# Patient Record
Sex: FEMALE | Race: WHITE | Employment: FULL TIME | URBAN - NONMETROPOLITAN AREA
[De-identification: names, ages, dates, MRNs, and addresses within clinical notes are randomized per-mention and may not be internally consistent; named-entity substitution may affect disease eponyms.]

---

## 2019-02-22 ENCOUNTER — HOSPITAL ENCOUNTER (OUTPATIENT)
Dept: CT IMAGING | Age: 49
Discharge: HOME | End: 2019-02-22
Payer: COMMERCIAL

## 2019-02-22 VITALS
HEIGHT: 69 IN | BODY MASS INDEX: 40.43 KG/M2 | TEMPERATURE: 97.9 F | OXYGEN SATURATION: 97 % | RESPIRATION RATE: 17 BRPM | WEIGHT: 273 LBS | HEART RATE: 74 BPM | SYSTOLIC BLOOD PRESSURE: 128 MMHG | DIASTOLIC BLOOD PRESSURE: 69 MMHG

## 2019-02-22 DIAGNOSIS — N18.1 CHRONIC KIDNEY DISEASE, STAGE I: ICD-10-CM

## 2019-02-22 LAB
INR BLD: 1.04 (ref 0.88–1.18)
PLATELET # BLD: 294 K/UL (ref 130–400)
PROTHROMBIN TIME: 13 SEC (ref 12–14.6)

## 2019-02-22 PROCEDURE — 85049 AUTOMATED PLATELET COUNT: CPT

## 2019-02-22 PROCEDURE — 2709999900 CT GUIDED NEEDLE PLACEMENT

## 2019-02-22 PROCEDURE — 85610 PROTHROMBIN TIME: CPT

## 2019-02-22 PROCEDURE — 2580000003 HC RX 258: Performed by: RADIOLOGY

## 2019-02-22 RX ORDER — MIDAZOLAM HYDROCHLORIDE 1 MG/ML
INJECTION INTRAMUSCULAR; INTRAVENOUS
Status: DISCONTINUED
Start: 2019-02-22 | End: 2019-02-22 | Stop reason: WASHOUT

## 2019-02-22 RX ORDER — SOLIFENACIN SUCCINATE 5 MG/1
10 TABLET, FILM COATED ORAL DAILY
COMMUNITY

## 2019-02-22 RX ORDER — LOSARTAN POTASSIUM AND HYDROCHLOROTHIAZIDE 25; 100 MG/1; MG/1
1 TABLET ORAL DAILY
COMMUNITY
End: 2021-08-24

## 2019-02-22 RX ORDER — CARVEDILOL 3.12 MG/1
3.12 TABLET ORAL 2 TIMES DAILY WITH MEALS
COMMUNITY

## 2019-02-22 RX ORDER — SODIUM CHLORIDE 0.9 % (FLUSH) 0.9 %
10 SYRINGE (ML) INJECTION PRN
Status: DISCONTINUED | OUTPATIENT
Start: 2019-02-22 | End: 2019-02-24 | Stop reason: HOSPADM

## 2019-02-22 RX ORDER — CETIRIZINE HYDROCHLORIDE 10 MG/1
10 TABLET ORAL DAILY
COMMUNITY

## 2019-02-22 RX ADMIN — Medication 10 ML: at 07:50

## 2019-04-19 LAB
ANION GAP SERPL CALCULATED.3IONS-SCNC: 11 MMOL/L (ref 7–19)
BUN BLDV-MCNC: 10 MG/DL (ref 6–20)
CALCIUM SERPL-MCNC: 9.4 MG/DL (ref 8.6–10)
CHLORIDE BLD-SCNC: 101 MMOL/L (ref 98–111)
CO2: 27 MMOL/L (ref 22–29)
CREAT SERPL-MCNC: 0.6 MG/DL (ref 0.5–0.9)
GFR NON-AFRICAN AMERICAN: >60
GLUCOSE BLD-MCNC: 130 MG/DL (ref 74–109)
POTASSIUM SERPL-SCNC: 3.4 MMOL/L (ref 3.5–5)
SODIUM BLD-SCNC: 139 MMOL/L (ref 136–145)

## 2020-01-07 LAB
ALBUMIN SERPL-MCNC: 3.9 G/DL (ref 3.5–5.2)
ALP BLD-CCNC: 58 U/L (ref 35–104)
ALT SERPL-CCNC: 20 U/L (ref 5–33)
ANION GAP SERPL CALCULATED.3IONS-SCNC: 15 MMOL/L (ref 7–19)
AST SERPL-CCNC: 17 U/L (ref 5–32)
BACTERIA: ABNORMAL /HPF
BASOPHILS ABSOLUTE: 0.1 K/UL (ref 0–0.2)
BASOPHILS RELATIVE PERCENT: 0.6 % (ref 0–1)
BILIRUB SERPL-MCNC: 0.7 MG/DL (ref 0.2–1.2)
BILIRUBIN URINE: NEGATIVE
BLOOD, URINE: NEGATIVE
BUN BLDV-MCNC: 13 MG/DL (ref 6–20)
CALCIUM SERPL-MCNC: 9.3 MG/DL (ref 8.6–10)
CHLORIDE BLD-SCNC: 101 MMOL/L (ref 98–111)
CLARITY: CLEAR
CO2: 25 MMOL/L (ref 22–29)
COLOR: YELLOW
CREAT SERPL-MCNC: 0.6 MG/DL (ref 0.5–0.9)
CREATININE URINE: 54.1 MG/DL (ref 4.2–622)
EOSINOPHILS ABSOLUTE: 0.2 K/UL (ref 0–0.6)
EOSINOPHILS RELATIVE PERCENT: 2.4 % (ref 0–5)
EPITHELIAL CELLS, UA: 5 /HPF (ref 0–5)
GFR NON-AFRICAN AMERICAN: >60
GLUCOSE BLD-MCNC: 121 MG/DL (ref 74–109)
GLUCOSE URINE: NEGATIVE MG/DL
HCT VFR BLD CALC: 47 % (ref 37–47)
HEMOGLOBIN: 15.8 G/DL (ref 12–16)
HYALINE CASTS: 4 /HPF (ref 0–8)
IMMATURE GRANULOCYTES #: 0 K/UL
KETONES, URINE: NEGATIVE MG/DL
LEUKOCYTE ESTERASE, URINE: ABNORMAL
LYMPHOCYTES ABSOLUTE: 2.2 K/UL (ref 1.1–4.5)
LYMPHOCYTES RELATIVE PERCENT: 24.7 % (ref 20–40)
MAGNESIUM: 2 MG/DL (ref 1.6–2.6)
MCH RBC QN AUTO: 31 PG (ref 27–31)
MCHC RBC AUTO-ENTMCNC: 33.6 G/DL (ref 33–37)
MCV RBC AUTO: 92.2 FL (ref 81–99)
MONOCYTES ABSOLUTE: 0.6 K/UL (ref 0–0.9)
MONOCYTES RELATIVE PERCENT: 7.2 % (ref 0–10)
NEUTROPHILS ABSOLUTE: 5.7 K/UL (ref 1.5–7.5)
NEUTROPHILS RELATIVE PERCENT: 64.8 % (ref 50–65)
NITRITE, URINE: NEGATIVE
PARATHYROID HORMONE INTACT: 56.4 PG/ML (ref 15–65)
PDW BLD-RTO: 12.3 % (ref 11.5–14.5)
PH UA: 7 (ref 5–8)
PHOSPHORUS: 3.2 MG/DL (ref 2.5–4.5)
PLATELET # BLD: 336 K/UL (ref 130–400)
PMV BLD AUTO: 10.1 FL (ref 9.4–12.3)
POTASSIUM SERPL-SCNC: 3.9 MMOL/L (ref 3.5–5)
PROTEIN PROTEIN: 12 MG/DL (ref 15–45)
PROTEIN UA: NEGATIVE MG/DL
RBC # BLD: 5.1 M/UL (ref 4.2–5.4)
RBC UA: 2 /HPF (ref 0–4)
SODIUM BLD-SCNC: 141 MMOL/L (ref 136–145)
SPECIFIC GRAVITY UA: 1.01 (ref 1–1.03)
TOTAL PROTEIN: 7.7 G/DL (ref 6.6–8.7)
URIC ACID, SERUM: 6.5 MG/DL (ref 2.4–5.7)
UROBILINOGEN, URINE: 0.2 E.U./DL
WBC # BLD: 8.7 K/UL (ref 4.8–10.8)
WBC UA: 12 /HPF (ref 0–5)

## 2021-08-02 ENCOUNTER — OFFICE VISIT (OUTPATIENT)
Dept: SURGERY | Age: 51
End: 2021-08-02
Payer: COMMERCIAL

## 2021-08-02 VITALS
TEMPERATURE: 98.5 F | HEIGHT: 69 IN | WEIGHT: 272.8 LBS | SYSTOLIC BLOOD PRESSURE: 148 MMHG | DIASTOLIC BLOOD PRESSURE: 80 MMHG | BODY MASS INDEX: 40.4 KG/M2

## 2021-08-02 DIAGNOSIS — K80.64 CALCULUS OF GALLBLADDER AND BILE DUCT WITH CHRONIC CHOLECYSTITIS WITHOUT OBSTRUCTION: Primary | ICD-10-CM

## 2021-08-02 PROCEDURE — 99203 OFFICE O/P NEW LOW 30 MIN: CPT | Performed by: PHYSICIAN ASSISTANT

## 2021-08-02 RX ORDER — AMLODIPINE BESYLATE 10 MG/1
10 TABLET ORAL DAILY
COMMUNITY

## 2021-08-02 RX ORDER — FAMOTIDINE 40 MG/1
40 TABLET, FILM COATED ORAL DAILY
COMMUNITY

## 2021-08-02 NOTE — LETTER
Preop Orders:     Patient: Chi Medeiros  : 1970    Hospital:  [facility]    Admitting Physician:  Cindi Hernandez or Pravin Duarte    Diagnosis:Cholecystitis with cholelithiasis    Procedure: laparoscopic or robotic cholecystectomy     Anesthesia: General    Admission:Outpatient    Date:next available    Labs:  Per anesthesia    Pre-Op Meds:  Kefzol 2grm IV    Latex Allergy: no    Beta Blocker: yes - patient advised to take    Medication Instructions: No plavix for 2 weeks prior to procedure; no asprin for 3 days prior to procedure    This has been electronically signed by Mason Price M.D.

## 2021-08-02 NOTE — PROGRESS NOTES
HISTORY OF PRESENT ILLNESS:  Baldo Hua comes today to discuss possible gallbladder surgery. There is some reported post prandial abdominal pain that started several weeks ago. The patient denies fevers, chills or jaundice. Recent ultrasound has shown gallstones. The common bile duct was normal in size. We discussed the pathophysiology of gallbladder disease and the risk benefits and alternatives of surgery. Victoria Torre wishes to proceed. Current Outpatient Medications   Medication Sig Dispense Refill    famotidine (PEPCID) 40 MG tablet Take 40 mg by mouth daily      amLODIPine (NORVASC) 10 MG tablet Take 10 mg by mouth daily      Omega-3 Fatty Acids (FISH OIL PO) Take by mouth      VITAMIN D PO Take by mouth      losartan-hydrochlorothiazide (HYZAAR) 100-25 MG per tablet Take 1 tablet by mouth daily      solifenacin (VESICARE) 5 MG tablet Take 10 mg by mouth daily      cetirizine (ZYRTEC) 10 MG tablet Take 10 mg by mouth daily      levonorgestrel (MIRENA, 52 MG,) IUD 52 mg 1 each by Intrauterine route once      carvedilol (COREG) 3.125 MG tablet Take 3.125 mg by mouth 2 times daily (with meals)       No current facility-administered medications for this visit. Allergies: Sulfa antibiotics     Past Medical History:   Diagnosis Date    Allergic rhinitis     CKD (chronic kidney disease), symptom management only, stage 1     Hyperlipidemia     Hypertension     Proteinuria      Past Surgical History:   Procedure Laterality Date    KIDNEY BIOPSY Right 02/22/2019    MOUTH SURGERY      5 teeth removed    VARICOSE VEIN SURGERY       Family History   Problem Relation Age of Onset    Diabetes Mother     Stroke Mother     Coronary Art Dis Father      Social History     Tobacco Use    Smoking status: Never Smoker    Smokeless tobacco: Never Used   Substance Use Topics    Alcohol use: No       Review of Systems  Reviewed and positive for the above. All other systems negative.     Physical Exam  Blood pressure (!) 148/80, temperature 98.5 °F (36.9 °C), temperature source Temporal, height 5' 9\" (1.753 m), weight 272 lb 12.8 oz (123.7 kg). GENERAL:  Reveals a 45 y.o. female that  appears to be in no acute distress. HEENT:  Head is normocephalic and atraumatic. NECK:  Neck is supple without masses. CHEST:  Patient has normal respiratory effort. Chest is clear bilaterally with good thoracic expansion. HEART:  Heart demonstrated a regular rhythm and rate. ABDOMEN:  Inspection of the abdomen demonstrated the patient to have normal bowel sounds present. The abdomen is soft mild right upper quadrant tenderness. EXTREMITIES:  Extremities demonstrated no cyanosis or pitting edema bilaterally. PSYCHIATRIC:  Patient is oriented to time, place and person. The patient's mood and affect are normal.    IMPRESSION:  Chronic Cholecystitis with Cholelithiasis      PLAN:  The risks, benefits, and options laparoscopic or robotic cholecystectomy were discussed with the patient. She  is willing to proceed with surgery.

## 2021-08-24 ENCOUNTER — HOSPITAL ENCOUNTER (OUTPATIENT)
Dept: PREADMISSION TESTING | Age: 51
Discharge: HOME | End: 2021-08-28
Payer: COMMERCIAL

## 2021-08-24 VITALS — BODY MASS INDEX: 40.58 KG/M2 | HEIGHT: 69 IN | WEIGHT: 274 LBS

## 2021-08-24 LAB
ALBUMIN SERPL-MCNC: 3.9 G/DL (ref 3.5–5.2)
ALP BLD-CCNC: 66 U/L (ref 35–104)
ALT SERPL-CCNC: 27 U/L (ref 5–33)
ANION GAP SERPL CALCULATED.3IONS-SCNC: 14 MMOL/L (ref 7–19)
AST SERPL-CCNC: 23 U/L (ref 5–32)
BASOPHILS ABSOLUTE: 0.1 K/UL (ref 0–0.2)
BASOPHILS RELATIVE PERCENT: 0.8 % (ref 0–1)
BILIRUB SERPL-MCNC: 0.7 MG/DL (ref 0.2–1.2)
BUN BLDV-MCNC: 13 MG/DL (ref 6–20)
CALCIUM SERPL-MCNC: 9.4 MG/DL (ref 8.6–10)
CHLORIDE BLD-SCNC: 103 MMOL/L (ref 98–111)
CO2: 25 MMOL/L (ref 22–29)
CREAT SERPL-MCNC: 0.7 MG/DL (ref 0.5–0.9)
EKG P AXIS: 29 DEGREES
EKG P-R INTERVAL: 176 MS
EKG Q-T INTERVAL: 422 MS
EKG QRS DURATION: 100 MS
EKG QTC CALCULATION (BAZETT): 430 MS
EKG T AXIS: 7 DEGREES
EOSINOPHILS ABSOLUTE: 0.3 K/UL (ref 0–0.6)
EOSINOPHILS RELATIVE PERCENT: 3.2 % (ref 0–5)
GFR AFRICAN AMERICAN: >59
GFR NON-AFRICAN AMERICAN: >60
GLUCOSE BLD-MCNC: 130 MG/DL (ref 74–109)
HCT VFR BLD CALC: 45.2 % (ref 37–47)
HEMOGLOBIN: 14.9 G/DL (ref 12–16)
IMMATURE GRANULOCYTES #: 0 K/UL
LYMPHOCYTES ABSOLUTE: 2.2 K/UL (ref 1.1–4.5)
LYMPHOCYTES RELATIVE PERCENT: 25.4 % (ref 20–40)
MCH RBC QN AUTO: 30.9 PG (ref 27–31)
MCHC RBC AUTO-ENTMCNC: 33 G/DL (ref 33–37)
MCV RBC AUTO: 93.8 FL (ref 81–99)
MONOCYTES ABSOLUTE: 0.6 K/UL (ref 0–0.9)
MONOCYTES RELATIVE PERCENT: 6.8 % (ref 0–10)
NEUTROPHILS ABSOLUTE: 5.6 K/UL (ref 1.5–7.5)
NEUTROPHILS RELATIVE PERCENT: 63.6 % (ref 50–65)
PDW BLD-RTO: 12.5 % (ref 11.5–14.5)
PLATELET # BLD: 302 K/UL (ref 130–400)
PMV BLD AUTO: 10.8 FL (ref 9.4–12.3)
POTASSIUM SERPL-SCNC: 4 MMOL/L (ref 3.5–5)
RBC # BLD: 4.82 M/UL (ref 4.2–5.4)
SARS-COV-2, NAAT: NOT DETECTED
SODIUM BLD-SCNC: 142 MMOL/L (ref 136–145)
TOTAL PROTEIN: 7.8 G/DL (ref 6.6–8.7)
WBC # BLD: 8.8 K/UL (ref 4.8–10.8)

## 2021-08-24 PROCEDURE — 87635 SARS-COV-2 COVID-19 AMP PRB: CPT

## 2021-08-24 PROCEDURE — 93005 ELECTROCARDIOGRAM TRACING: CPT | Performed by: SURGERY

## 2021-08-24 PROCEDURE — 80053 COMPREHEN METABOLIC PANEL: CPT

## 2021-08-24 PROCEDURE — 93010 ELECTROCARDIOGRAM REPORT: CPT | Performed by: INTERNAL MEDICINE

## 2021-08-24 PROCEDURE — 85025 COMPLETE CBC W/AUTO DIFF WBC: CPT

## 2021-08-24 RX ORDER — HYDROCHLOROTHIAZIDE 25 MG/1
25 TABLET ORAL DAILY
COMMUNITY

## 2021-08-24 RX ORDER — LOSARTAN POTASSIUM 100 MG/1
100 TABLET ORAL DAILY
COMMUNITY

## 2021-08-24 RX ORDER — ACETAMINOPHEN 160 MG
1 TABLET,DISINTEGRATING ORAL DAILY
COMMUNITY

## 2021-08-25 ENCOUNTER — PREP FOR PROCEDURE (OUTPATIENT)
Dept: SURGERY | Age: 51
End: 2021-08-25

## 2021-08-25 ENCOUNTER — ANESTHESIA (OUTPATIENT)
Dept: OPERATING ROOM | Age: 51
End: 2021-08-25
Payer: COMMERCIAL

## 2021-08-25 ENCOUNTER — ANESTHESIA EVENT (OUTPATIENT)
Dept: OPERATING ROOM | Age: 51
End: 2021-08-25
Payer: COMMERCIAL

## 2021-08-25 ENCOUNTER — HOSPITAL ENCOUNTER (OUTPATIENT)
Age: 51
Setting detail: OUTPATIENT SURGERY
Discharge: HOME | End: 2021-08-25
Attending: SURGERY | Admitting: SURGERY
Payer: COMMERCIAL

## 2021-08-25 VITALS
RESPIRATION RATE: 18 BRPM | SYSTOLIC BLOOD PRESSURE: 115 MMHG | OXYGEN SATURATION: 96 % | HEIGHT: 69 IN | DIASTOLIC BLOOD PRESSURE: 70 MMHG | WEIGHT: 274 LBS | BODY MASS INDEX: 40.58 KG/M2 | TEMPERATURE: 97.2 F | HEART RATE: 74 BPM

## 2021-08-25 VITALS — DIASTOLIC BLOOD PRESSURE: 69 MMHG | SYSTOLIC BLOOD PRESSURE: 128 MMHG | OXYGEN SATURATION: 97 % | TEMPERATURE: 97.2 F

## 2021-08-25 DIAGNOSIS — K80.20 SYMPTOMATIC CHOLELITHIASIS: Primary | ICD-10-CM

## 2021-08-25 LAB — HCG(URINE) PREGNANCY TEST: NEGATIVE

## 2021-08-25 PROCEDURE — 3700000000 HC ANESTHESIA ATTENDED CARE: Performed by: SURGERY

## 2021-08-25 PROCEDURE — 7100000011 HC PHASE II RECOVERY - ADDTL 15 MIN: Performed by: SURGERY

## 2021-08-25 PROCEDURE — 88304 TISSUE EXAM BY PATHOLOGIST: CPT

## 2021-08-25 PROCEDURE — 3700000001 HC ADD 15 MINUTES (ANESTHESIA): Performed by: SURGERY

## 2021-08-25 PROCEDURE — 84703 CHORIONIC GONADOTROPIN ASSAY: CPT

## 2021-08-25 PROCEDURE — 2580000003 HC RX 258: Performed by: ANESTHESIOLOGY

## 2021-08-25 PROCEDURE — 2500000003 HC RX 250 WO HCPCS: Performed by: SURGERY

## 2021-08-25 PROCEDURE — 6370000000 HC RX 637 (ALT 250 FOR IP): Performed by: ANESTHESIOLOGY

## 2021-08-25 PROCEDURE — S2900 ROBOTIC SURGICAL SYSTEM: HCPCS | Performed by: SURGERY

## 2021-08-25 PROCEDURE — 7100000010 HC PHASE II RECOVERY - FIRST 15 MIN: Performed by: SURGERY

## 2021-08-25 PROCEDURE — 6360000002 HC RX W HCPCS: Performed by: ANESTHESIOLOGY

## 2021-08-25 PROCEDURE — 2709999900 HC NON-CHARGEABLE SUPPLY: Performed by: SURGERY

## 2021-08-25 PROCEDURE — 7100000001 HC PACU RECOVERY - ADDTL 15 MIN: Performed by: SURGERY

## 2021-08-25 PROCEDURE — 3600000009 HC SURGERY ROBOT BASE: Performed by: SURGERY

## 2021-08-25 PROCEDURE — 47562 LAPAROSCOPIC CHOLECYSTECTOMY: CPT | Performed by: SURGERY

## 2021-08-25 PROCEDURE — 7100000000 HC PACU RECOVERY - FIRST 15 MIN: Performed by: SURGERY

## 2021-08-25 PROCEDURE — 6360000002 HC RX W HCPCS

## 2021-08-25 PROCEDURE — 2500000003 HC RX 250 WO HCPCS: Performed by: ANESTHESIOLOGY

## 2021-08-25 PROCEDURE — 3600000019 HC SURGERY ROBOT ADDTL 15MIN: Performed by: SURGERY

## 2021-08-25 PROCEDURE — 2500000003 HC RX 250 WO HCPCS

## 2021-08-25 RX ORDER — SODIUM CHLORIDE 0.9 % (FLUSH) 0.9 %
10 SYRINGE (ML) INJECTION PRN
Status: DISCONTINUED | OUTPATIENT
Start: 2021-08-25 | End: 2021-08-25 | Stop reason: HOSPADM

## 2021-08-25 RX ORDER — FENTANYL CITRATE 50 UG/ML
50 INJECTION, SOLUTION INTRAMUSCULAR; INTRAVENOUS EVERY 5 MIN PRN
Status: DISCONTINUED | OUTPATIENT
Start: 2021-08-25 | End: 2021-08-25 | Stop reason: HOSPADM

## 2021-08-25 RX ORDER — HYDROMORPHONE HYDROCHLORIDE 1 MG/ML
0.25 INJECTION, SOLUTION INTRAMUSCULAR; INTRAVENOUS; SUBCUTANEOUS EVERY 5 MIN PRN
Status: DISCONTINUED | OUTPATIENT
Start: 2021-08-25 | End: 2021-08-25 | Stop reason: HOSPADM

## 2021-08-25 RX ORDER — ONDANSETRON 4 MG/1
4 TABLET, ORALLY DISINTEGRATING ORAL EVERY 8 HOURS PRN
Qty: 10 TABLET | Refills: 2 | Status: SHIPPED | OUTPATIENT
Start: 2021-08-25 | End: 2022-09-26

## 2021-08-25 RX ORDER — ONDANSETRON 2 MG/ML
4 INJECTION INTRAMUSCULAR; INTRAVENOUS
Status: DISCONTINUED | OUTPATIENT
Start: 2021-08-25 | End: 2021-08-25 | Stop reason: HOSPADM

## 2021-08-25 RX ORDER — DEXAMETHASONE SODIUM PHOSPHATE 4 MG/ML
4 INJECTION, SOLUTION INTRA-ARTICULAR; INTRALESIONAL; INTRAMUSCULAR; INTRAVENOUS; SOFT TISSUE ONCE
Status: COMPLETED | OUTPATIENT
Start: 2021-08-25 | End: 2021-08-25

## 2021-08-25 RX ORDER — HYDROCODONE BITARTRATE AND ACETAMINOPHEN 5; 325 MG/1; MG/1
1 TABLET ORAL EVERY 4 HOURS PRN
Status: DISCONTINUED | OUTPATIENT
Start: 2021-08-25 | End: 2021-08-25 | Stop reason: HOSPADM

## 2021-08-25 RX ORDER — SODIUM CHLORIDE, SODIUM LACTATE, POTASSIUM CHLORIDE, CALCIUM CHLORIDE 600; 310; 30; 20 MG/100ML; MG/100ML; MG/100ML; MG/100ML
INJECTION, SOLUTION INTRAVENOUS CONTINUOUS
Status: DISCONTINUED | OUTPATIENT
Start: 2021-08-25 | End: 2021-08-25 | Stop reason: HOSPADM

## 2021-08-25 RX ORDER — BUPIVACAINE HYDROCHLORIDE 2.5 MG/ML
INJECTION, SOLUTION INFILTRATION; PERINEURAL PRN
Status: DISCONTINUED | OUTPATIENT
Start: 2021-08-25 | End: 2021-08-25 | Stop reason: ALTCHOICE

## 2021-08-25 RX ORDER — ROCURONIUM BROMIDE 10 MG/ML
INJECTION, SOLUTION INTRAVENOUS PRN
Status: DISCONTINUED | OUTPATIENT
Start: 2021-08-25 | End: 2021-08-25 | Stop reason: SDUPTHER

## 2021-08-25 RX ORDER — HYDROMORPHONE HYDROCHLORIDE 1 MG/ML
0.5 INJECTION, SOLUTION INTRAMUSCULAR; INTRAVENOUS; SUBCUTANEOUS EVERY 5 MIN PRN
Status: DISCONTINUED | OUTPATIENT
Start: 2021-08-25 | End: 2021-08-25 | Stop reason: HOSPADM

## 2021-08-25 RX ORDER — SODIUM CHLORIDE 0.9 % (FLUSH) 0.9 %
5-40 SYRINGE (ML) INJECTION PRN
Status: CANCELLED | OUTPATIENT
Start: 2021-08-25

## 2021-08-25 RX ORDER — ONDANSETRON 2 MG/ML
INJECTION INTRAMUSCULAR; INTRAVENOUS PRN
Status: DISCONTINUED | OUTPATIENT
Start: 2021-08-25 | End: 2021-08-25

## 2021-08-25 RX ORDER — MORPHINE SULFATE 4 MG/ML
4 INJECTION, SOLUTION INTRAMUSCULAR; INTRAVENOUS
Status: DISCONTINUED | OUTPATIENT
Start: 2021-08-25 | End: 2021-08-25 | Stop reason: HOSPADM

## 2021-08-25 RX ORDER — LIDOCAINE HYDROCHLORIDE 10 MG/ML
INJECTION, SOLUTION EPIDURAL; INFILTRATION; INTRACAUDAL; PERINEURAL PRN
Status: DISCONTINUED | OUTPATIENT
Start: 2021-08-25 | End: 2021-08-25 | Stop reason: SDUPTHER

## 2021-08-25 RX ORDER — DOCUSATE SODIUM 100 MG/1
100 CAPSULE, LIQUID FILLED ORAL 2 TIMES DAILY PRN
Qty: 60 CAPSULE | Refills: 0 | Status: SHIPPED | OUTPATIENT
Start: 2021-08-25 | End: 2022-09-26

## 2021-08-25 RX ORDER — HYDROCODONE BITARTRATE AND ACETAMINOPHEN 5; 325 MG/1; MG/1
2 TABLET ORAL EVERY 4 HOURS PRN
Status: DISCONTINUED | OUTPATIENT
Start: 2021-08-25 | End: 2021-08-25 | Stop reason: HOSPADM

## 2021-08-25 RX ORDER — ONDANSETRON 2 MG/ML
4 INJECTION INTRAMUSCULAR; INTRAVENOUS EVERY 6 HOURS PRN
Status: CANCELLED | OUTPATIENT
Start: 2021-08-25

## 2021-08-25 RX ORDER — MORPHINE SULFATE 4 MG/ML
2 INJECTION, SOLUTION INTRAMUSCULAR; INTRAVENOUS
Status: DISCONTINUED | OUTPATIENT
Start: 2021-08-25 | End: 2021-08-25 | Stop reason: HOSPADM

## 2021-08-25 RX ORDER — SUCCINYLCHOLINE CHLORIDE 20 MG/ML
INJECTION INTRAMUSCULAR; INTRAVENOUS PRN
Status: DISCONTINUED | OUTPATIENT
Start: 2021-08-25 | End: 2021-08-25 | Stop reason: SDUPTHER

## 2021-08-25 RX ORDER — PROPOFOL 10 MG/ML
INJECTION, EMULSION INTRAVENOUS PRN
Status: DISCONTINUED | OUTPATIENT
Start: 2021-08-25 | End: 2021-08-25 | Stop reason: SDUPTHER

## 2021-08-25 RX ORDER — SCOLOPAMINE TRANSDERMAL SYSTEM 1 MG/1
1 PATCH, EXTENDED RELEASE TRANSDERMAL
Status: DISCONTINUED | OUTPATIENT
Start: 2021-08-25 | End: 2021-08-25 | Stop reason: HOSPADM

## 2021-08-25 RX ORDER — DEXAMETHASONE SODIUM PHOSPHATE 10 MG/ML
INJECTION, SOLUTION INTRAMUSCULAR; INTRAVENOUS PRN
Status: DISCONTINUED | OUTPATIENT
Start: 2021-08-25 | End: 2021-08-25 | Stop reason: SDUPTHER

## 2021-08-25 RX ORDER — ONDANSETRON 4 MG/1
4 TABLET, ORALLY DISINTEGRATING ORAL EVERY 8 HOURS PRN
Status: CANCELLED | OUTPATIENT
Start: 2021-08-25

## 2021-08-25 RX ORDER — SODIUM CHLORIDE 0.9 % (FLUSH) 0.9 %
5-40 SYRINGE (ML) INJECTION EVERY 12 HOURS SCHEDULED
Status: CANCELLED | OUTPATIENT
Start: 2021-08-25

## 2021-08-25 RX ORDER — FENTANYL CITRATE 50 UG/ML
INJECTION, SOLUTION INTRAMUSCULAR; INTRAVENOUS PRN
Status: DISCONTINUED | OUTPATIENT
Start: 2021-08-25 | End: 2021-08-25 | Stop reason: SDUPTHER

## 2021-08-25 RX ORDER — INDOCYANINE GREEN AND WATER 25 MG
KIT INJECTION PRN
Status: DISCONTINUED | OUTPATIENT
Start: 2021-08-25 | End: 2021-08-25 | Stop reason: ALTCHOICE

## 2021-08-25 RX ORDER — HYDROCODONE BITARTRATE AND ACETAMINOPHEN 5; 325 MG/1; MG/1
2 TABLET ORAL EVERY 6 HOURS PRN
Qty: 24 TABLET | Refills: 0 | Status: SHIPPED | OUTPATIENT
Start: 2021-08-25 | End: 2021-08-30

## 2021-08-25 RX ORDER — SODIUM CHLORIDE 0.9 % (FLUSH) 0.9 %
10 SYRINGE (ML) INJECTION EVERY 12 HOURS SCHEDULED
Status: DISCONTINUED | OUTPATIENT
Start: 2021-08-25 | End: 2021-08-25 | Stop reason: HOSPADM

## 2021-08-25 RX ORDER — MIDAZOLAM HYDROCHLORIDE 1 MG/ML
INJECTION INTRAMUSCULAR; INTRAVENOUS PRN
Status: DISCONTINUED | OUTPATIENT
Start: 2021-08-25 | End: 2021-08-25 | Stop reason: SDUPTHER

## 2021-08-25 RX ORDER — SODIUM CHLORIDE 9 MG/ML
25 INJECTION, SOLUTION INTRAVENOUS PRN
Status: DISCONTINUED | OUTPATIENT
Start: 2021-08-25 | End: 2021-08-25 | Stop reason: HOSPADM

## 2021-08-25 RX ORDER — KETOROLAC TROMETHAMINE 30 MG/ML
INJECTION, SOLUTION INTRAMUSCULAR; INTRAVENOUS PRN
Status: DISCONTINUED | OUTPATIENT
Start: 2021-08-25 | End: 2021-08-25 | Stop reason: SDUPTHER

## 2021-08-25 RX ORDER — SODIUM CHLORIDE 9 MG/ML
25 INJECTION, SOLUTION INTRAVENOUS PRN
Status: CANCELLED | OUTPATIENT
Start: 2021-08-25

## 2021-08-25 RX ADMIN — FENTANYL CITRATE 50 MCG: 50 INJECTION, SOLUTION INTRAMUSCULAR; INTRAVENOUS at 09:35

## 2021-08-25 RX ADMIN — FENTANYL CITRATE 100 MCG: 50 INJECTION, SOLUTION INTRAMUSCULAR; INTRAVENOUS at 08:15

## 2021-08-25 RX ADMIN — MIDAZOLAM 2 MG: 1 INJECTION INTRAMUSCULAR; INTRAVENOUS at 08:09

## 2021-08-25 RX ADMIN — SODIUM CHLORIDE, SODIUM LACTATE, POTASSIUM CHLORIDE, AND CALCIUM CHLORIDE: 600; 310; 30; 20 INJECTION, SOLUTION INTRAVENOUS at 07:00

## 2021-08-25 RX ADMIN — SUGAMMADEX 400 MG: 100 INJECTION, SOLUTION INTRAVENOUS at 09:42

## 2021-08-25 RX ADMIN — KETOROLAC TROMETHAMINE 30 MG: 30 INJECTION, SOLUTION INTRAMUSCULAR; INTRAVENOUS at 09:36

## 2021-08-25 RX ADMIN — FENTANYL CITRATE 50 MCG: 50 INJECTION, SOLUTION INTRAMUSCULAR; INTRAVENOUS at 09:55

## 2021-08-25 RX ADMIN — SUCCINYLCHOLINE CHLORIDE 100 MG: 20 INJECTION, SOLUTION INTRAMUSCULAR; INTRAVENOUS at 08:20

## 2021-08-25 RX ADMIN — FAMOTIDINE 20 MG: 10 INJECTION, SOLUTION INTRAVENOUS at 07:00

## 2021-08-25 RX ADMIN — FENTANYL CITRATE 50 MCG: 50 INJECTION, SOLUTION INTRAMUSCULAR; INTRAVENOUS at 08:56

## 2021-08-25 RX ADMIN — DEXAMETHASONE SODIUM PHOSPHATE 4 MG: 4 INJECTION, SOLUTION INTRAMUSCULAR; INTRAVENOUS at 07:01

## 2021-08-25 RX ADMIN — ONDANSETRON HYDROCHLORIDE 4 MG: 2 INJECTION, SOLUTION INTRAMUSCULAR; INTRAVENOUS at 08:56

## 2021-08-25 RX ADMIN — PROPOFOL 160 MG: 10 INJECTION, EMULSION INTRAVENOUS at 08:20

## 2021-08-25 RX ADMIN — ROCURONIUM BROMIDE 50 MG: 10 INJECTION, SOLUTION INTRAVENOUS at 08:27

## 2021-08-25 RX ADMIN — LIDOCAINE HYDROCHLORIDE 50 MG: 10 INJECTION, SOLUTION EPIDURAL; INFILTRATION; INTRACAUDAL; PERINEURAL at 08:20

## 2021-08-25 RX ADMIN — SODIUM CHLORIDE, SODIUM LACTATE, POTASSIUM CHLORIDE, AND CALCIUM CHLORIDE: 600; 310; 30; 20 INJECTION, SOLUTION INTRAVENOUS at 09:01

## 2021-08-25 RX ADMIN — DEXAMETHASONE SODIUM PHOSPHATE 10 MG: 10 INJECTION, SOLUTION INTRAMUSCULAR; INTRAVENOUS at 08:30

## 2021-08-25 ASSESSMENT — PAIN SCALES - GENERAL
PAINLEVEL_OUTOF10: 0
PAINLEVEL_OUTOF10: 0

## 2021-08-25 ASSESSMENT — PAIN - FUNCTIONAL ASSESSMENT: PAIN_FUNCTIONAL_ASSESSMENT: 0-10

## 2021-08-25 ASSESSMENT — LIFESTYLE VARIABLES: SMOKING_STATUS: 0

## 2021-08-25 ASSESSMENT — PAIN DESCRIPTION - FREQUENCY: FREQUENCY: INTERMITTENT

## 2021-08-25 NOTE — ANESTHESIA PRE PROCEDURE
Department of Anesthesiology  Preprocedure Note       Name:  Harpal Momin   Age:  45 y.o.  :  1970                                          MRN:  [id number]         Date:  2021      Surgeon: Leanne Zarate):  Roger العلي MD    Procedure: Procedure(s):  LAPAROSCOPIC ASSIST ROBOTIC CHOLECYSTECTOMY WITH ICG    Medications prior to admission:   Prior to Admission medications    Medication Sig Start Date End Date Taking? Authorizing Provider   losartan (COZAAR) 100 MG tablet Take 100 mg by mouth daily    Historical Provider, MD   hydroCHLOROthiazide (HYDRODIURIL) 25 MG tablet Take 25 mg by mouth daily    Historical Provider, MD   Cholecalciferol (VITAMIN D3) 50 MCG (2000) CAPS Take 1 capsule by mouth daily    Historical Provider, MD   famotidine (PEPCID) 40 MG tablet Take 40 mg by mouth daily    Historical Provider, MD   amLODIPine (NORVASC) 10 MG tablet Take 10 mg by mouth daily    Historical Provider, MD   Omega-3 Fatty Acids (FISH OIL PO) Take 1 capsule by mouth daily     Historical Provider, MD   solifenacin (VESICARE) 5 MG tablet Take 5 mg by mouth daily     Historical Provider, MD   cetirizine (ZYRTEC) 10 MG tablet Take 10 mg by mouth daily    Historical Provider, MD   levonorgestrel (MIRENA, 52 MG,) IUD 52 mg 1 each by Intrauterine route once    Historical Provider, MD   carvedilol (COREG) 3.125 MG tablet Take 3.125 mg by mouth 2 times daily (with meals)    Historical Provider, MD       Current medications:    No current facility-administered medications for this encounter. Allergies: Allergies   Allergen Reactions    Sulfa Antibiotics        Problem List:  There is no problem list on file for this patient.       Past Medical History:        Diagnosis Date    Allergic rhinitis     CKD (chronic kidney disease), symptom management only, stage 1     Hyperlipidemia     Hypertension     Proteinuria        Past Surgical History:        Procedure Laterality Date    KIDNEY BIOPSY Right 02/22/2019    VARICOSE VEIN SURGERY      WISDOM TOOTH EXTRACTION         Social History:    Social History     Tobacco Use    Smoking status: Never Smoker    Smokeless tobacco: Never Used   Substance Use Topics    Alcohol use: Yes     Comment: RARELY                                Counseling given: Not Answered      Vital Signs (Current): There were no vitals filed for this visit. BP Readings from Last 3 Encounters:   08/02/21 (!) 148/80   02/22/19 128/69       NPO Status:                                                                                 BMI:   Wt Readings from Last 3 Encounters:   08/24/21 274 lb (124.3 kg)   08/02/21 272 lb 12.8 oz (123.7 kg)   02/22/19 273 lb (123.8 kg)     There is no height or weight on file to calculate BMI.    CBC:   Lab Results   Component Value Date    WBC 8.8 08/24/2021    RBC 4.82 08/24/2021    HGB 14.9 08/24/2021    HCT 45.2 08/24/2021    MCV 93.8 08/24/2021    RDW 12.5 08/24/2021     08/24/2021       CMP:   Lab Results   Component Value Date     08/24/2021    K 4.0 08/24/2021     08/24/2021    CO2 25 08/24/2021    BUN 13 08/24/2021    CREATININE 0.7 08/24/2021    GFRAA >59 08/24/2021    LABGLOM >60 08/24/2021    GLUCOSE 130 08/24/2021    PROT 7.8 08/24/2021    CALCIUM 9.4 08/24/2021    BILITOT 0.7 08/24/2021    ALKPHOS 66 08/24/2021    AST 23 08/24/2021    ALT 27 08/24/2021       POC Tests: No results for input(s): POCGLU, POCNA, POCK, POCCL, POCBUN, POCHEMO, POCHCT in the last 72 hours.     Coags:   Lab Results   Component Value Date    PROTIME 13.0 02/22/2019    INR 1.04 02/22/2019       HCG (If Applicable):   Lab Results   Component Value Date    PREGTESTUR Negative 08/25/2021        ABGs: No results found for: PHART, PO2ART, CVJ9MQT, FSH9FRF, BEART, R7QDXTAI     Type & Screen (If Applicable):  No results found for: LABABO, LABRH    Drug/Infectious Status (If Applicable):  No results found for: HIV, HEPCAB    COVID-19 Screening (If Applicable):   Lab Results   Component Value Date    COVID19 Not Detected 08/24/2021           Anesthesia Evaluation  Patient summary reviewed no history of anesthetic complications:   Airway: Mallampati: I  TM distance: >3 FB   Neck ROM: full  Mouth opening: > = 3 FB Dental: normal exam         Pulmonary:normal exam  breath sounds clear to auscultation      (-) asthma, recent URI, sleep apnea and not a current smoker                           Cardiovascular:  Exercise tolerance: good (>4 METS),   (+) hypertension:,     (-) pacemaker, past MI, CABG/stent and  angina    ECG reviewed  Rhythm: regular  Rate: normal           Beta Blocker:  Dose within 24 Hrs         Neuro/Psych:      (-) seizures, TIA and CVA           GI/Hepatic/Renal:   (+) GERD:,      (-) liver disease and no renal disease       Endo/Other:        (-) diabetes mellitus, hypothyroidism, hyperthyroidism               Abdominal:             Vascular:   + DVT (Superficial, no further issues), . Other Findings:             Anesthesia Plan      general     ASA 2     (Preop famotidine, dexamethasone, scopolamine)  Induction: intravenous. MIPS: Postoperative opioids intended and Prophylactic antiemetics administered. Anesthetic plan and risks discussed with patient. Use of blood products discussed with patient whom consented to blood products. Plan discussed with CRNA.                 Angela Caraballo MD   8/25/2021

## 2021-08-25 NOTE — OP NOTE
OPERATIVE REPORT    PATIENT NAME: Luna RECORD NO. [id number]  SURGEON: Benja Montero MD MD   Primary Care Physician: Audie Chapin, APRN - NP  Date: 8/25/2021    PREOPERATIVE DIAGNOSIS  Symptomatic Cholelithiasis    POSTOPERATIVE DIAGNOSIS  Same, with chronic cholecystitis    PROCEDURE  Robotic Assisted Laparoscopic cholecystectomy with Firefly (ICG)    Anesthesia: GEA    EBL: Minimal    Findings: evidence of chronic cholecystitis, thickened scar tissue around neck    INDICATIONS  The patient presented with a complaint of RUQ abdominal pain. US was consistent with gallstones. PROCEDURE  After informed consent was obtained, the patient was taken to the operating room and placed in supine position. After the induction of adequate general endotracheal anesthesia the abdomen was prepped in the usual sterile fashion. Time out performed. Local anesthestic was administered to the inferior portion of the umbilicus, an incision was made, the base of the umbilicus was elevated, and the veress needle was inserted. The abdominal cavity was insufflated and the 8 mm port was inserted. The laparoscope was advanced and inspection undertaken. There was no evidence of trauma from the trocar insertion. Eight millimeter working ports were placed in line with the umbilicus, two ports on the patient's right and one on the left side. The patient was placed in reverse trendelenberg position and rotated to the left. The [address] operating system was brought into the field and docked. Working instruments were advanced and the fundus of the gallbladder was grasped and elevated. The peritoneum over the neck of the gallbladder was incised and dissection was begun. Firefly vision was used to visualize the cystic duct and biliary anatomy. The neck of the gallbladder was dissected with blunt and electorcautery to visualize the cystic duct. This was cleared of  surrounding tissue.  Adjacent to this the cystic artery was identified and also cleared of surrounding tissue. Dissection was carried up the cystic plate. A critical view of safety was obtained. The cystic duct was clipped proximally and distally with hemoclips and divided. The cystic artery was divided in a similar fashion. The gallbladder was then released from the undersurface of the liver using cautery. Once free, it was placed it in an Endocatch retrieval bag and removed through the umbilical incision. The area was irrigated and suctioned as needed. The clips were inspected, and were in good position with good hemostasis and no evidence of bile leak. The fascia at the umbilical site was closed using a 0 vicryl and the fascial closure device. The working ports were removed under vision with no bleeding noted. The pneumoperitoneum was released. Skin incisions were closed with interrupted 4-0 Monocryl subcuticular suture followed by Dermabond dressing. Ms. Sarah Spring tolerated her surgery well and she was taken to PACU in  satisfactory condition.         Albino Lawson MD  Electronically signed 8/25/2021 at 9:48 AM

## 2021-08-25 NOTE — H&P
HISTORY OF PRESENT ILLNESS:  Severo Michael comes today to discuss possible gallbladder surgery. There is some reported post prandial abdominal pain that started several weeks ago. The patient denies fevers, chills or jaundice. Recent ultrasound has shown gallstones. The common bile duct was normal in size.     We discussed the pathophysiology of gallbladder disease and the risk benefits and alternatives of surgery.   Woody Pallas wishes to proceed.     Current Facility-Administered Medications          Current Outpatient Medications   Medication Sig Dispense Refill    famotidine (PEPCID) 40 MG tablet Take 40 mg by mouth daily        amLODIPine (NORVASC) 10 MG tablet Take 10 mg by mouth daily        Omega-3 Fatty Acids (FISH OIL PO) Take by mouth        VITAMIN D PO Take by mouth        losartan-hydrochlorothiazide (HYZAAR) 100-25 MG per tablet Take 1 tablet by mouth daily        solifenacin (VESICARE) 5 MG tablet Take 10 mg by mouth daily        cetirizine (ZYRTEC) 10 MG tablet Take 10 mg by mouth daily        levonorgestrel (MIRENA, 52 MG,) IUD 52 mg 1 each by Intrauterine route once        carvedilol (COREG) 3.125 MG tablet Take 3.125 mg by mouth 2 times daily (with meals)          No current facility-administered medications for this visit.            Allergies: Sulfa antibiotics      Past Medical History        Past Medical History:   Diagnosis Date    Allergic rhinitis      CKD (chronic kidney disease), symptom management only, stage 1      Hyperlipidemia      Hypertension      Proteinuria           Past Surgical History         Past Surgical History:   Procedure Laterality Date    KIDNEY BIOPSY Right 02/22/2019    MOUTH SURGERY         5 teeth removed    VARICOSE VEIN SURGERY             Family History         Family History   Problem Relation Age of Onset    Diabetes Mother      Stroke Mother      Coronary Art Dis Father           Social History           Tobacco Use    Smoking status: Never

## 2021-08-25 NOTE — DISCHARGE INSTR - ACTIVITY
Activity as tolerated except no lifting more than 10 pounds for the first week and no driving if taking a pain medication. Okay to shower over incisions, but do not submerge under water like a tub or pool for one week. Watch for redness, drainage, fever, or worsening abdominal pain, and call for any questions or concerns.

## 2021-08-25 NOTE — ANESTHESIA POSTPROCEDURE EVALUATION
Department of Anesthesiology  Postprocedure Note    Patient: Kervin Monday  MRN: [id number]  YOB: 1970  Date of evaluation: 8/25/2021  Time:  9:58 AM     Procedure Summary     Date: 08/25/21 Room / Location: [address]    Anesthesia Start: 1632 Anesthesia Stop: 3232    Procedure: LAPAROSCOPIC ASSIST ROBOTIC CHOLECYSTECTOMY WITH ICG (N/A ) Diagnosis: (CHOLECYSTITIS WITH CHOLELITHIASIS)    Surgeons: Laurell Closs, MD Responsible Provider: OH Strauss CRNA    Anesthesia Type: general ASA Status: 2          Anesthesia Type: general    Keisha Phase I: Keisha Score: 8    Keisha Phase II:      Last vitals: Reviewed and per EMR flowsheets.        Anesthesia Post Evaluation

## 2021-08-25 NOTE — H&P (VIEW-ONLY)
HISTORY OF PRESENT ILLNESS:  Alisson Fry comes today to discuss possible gallbladder surgery. There is some reported post prandial abdominal pain that started several weeks ago. The patient denies fevers, chills or jaundice. Recent ultrasound has shown gallstones. The common bile duct was normal in size.     We discussed the pathophysiology of gallbladder disease and the risk benefits and alternatives of surgery.   Edmundo Esquivel wishes to proceed.     Current Facility-Administered Medications          Current Outpatient Medications   Medication Sig Dispense Refill    famotidine (PEPCID) 40 MG tablet Take 40 mg by mouth daily        amLODIPine (NORVASC) 10 MG tablet Take 10 mg by mouth daily        Omega-3 Fatty Acids (FISH OIL PO) Take by mouth        VITAMIN D PO Take by mouth        losartan-hydrochlorothiazide (HYZAAR) 100-25 MG per tablet Take 1 tablet by mouth daily        solifenacin (VESICARE) 5 MG tablet Take 10 mg by mouth daily        cetirizine (ZYRTEC) 10 MG tablet Take 10 mg by mouth daily        levonorgestrel (MIRENA, 52 MG,) IUD 52 mg 1 each by Intrauterine route once        carvedilol (COREG) 3.125 MG tablet Take 3.125 mg by mouth 2 times daily (with meals)          No current facility-administered medications for this visit.            Allergies: Sulfa antibiotics      Past Medical History        Past Medical History:   Diagnosis Date    Allergic rhinitis      CKD (chronic kidney disease), symptom management only, stage 1      Hyperlipidemia      Hypertension      Proteinuria           Past Surgical History         Past Surgical History:   Procedure Laterality Date    KIDNEY BIOPSY Right 02/22/2019    MOUTH SURGERY         5 teeth removed    VARICOSE VEIN SURGERY             Family History         Family History   Problem Relation Age of Onset    Diabetes Mother      Stroke Mother      Coronary Art Dis Father           Social History           Tobacco Use    Smoking status: Never Smoker    Smokeless tobacco: Never Used   Substance Use Topics    Alcohol use: No         Review of Systems  Reviewed and positive for the above. All other systems negative.     Physical Exam  Blood pressure (!) 148/80, temperature 98.5 °F (36.9 °C), temperature source Temporal, height 5' 9\" (1.753 m), weight 272 lb 12.8 oz (123.7 kg).     GENERAL:  Reveals a 45 y.o. female that  appears to be in no acute distress.     HEENT:  Head is normocephalic and atraumatic.     NECK:  Neck is supple without masses.     CHEST:  Patient has normal respiratory effort. Chest is clear bilaterally with good thoracic expansion.       HEART:  Heart demonstrated a regular rhythm and rate.     ABDOMEN:  Inspection of the abdomen demonstrated the patient to have normal bowel sounds present. The abdomen is soft mild right upper quadrant tenderness.        EXTREMITIES:  Extremities demonstrated no cyanosis or pitting edema bilaterally.     PSYCHIATRIC:  Patient is oriented to time, place and person. The patient's mood and affect are normal.     IMPRESSION:  Chronic Cholecystitis with Cholelithiasis        PLAN:  The risks, benefits, and options laparoscopic or robotic cholecystectomy were discussed with the patient. She  is willing to proceed with surgery.

## 2021-08-25 NOTE — INTERVAL H&P NOTE
Update History & Physical    The patient's History and Physical of August 2, 2021 was reviewed with the patient and I examined the patient. There was no change. The surgical site was confirmed by the patient and me. Plan: The risks, benefits, expected outcome, and alternative to the recommended procedure have been discussed with the patient. Patient understands and wants to proceed with the procedure.      Electronically signed by Spring Chandler MD on 8/25/2021 at 8:06 AM

## 2021-09-09 ENCOUNTER — OFFICE VISIT (OUTPATIENT)
Dept: SURGERY | Age: 51
End: 2021-09-09

## 2021-09-09 VITALS
TEMPERATURE: 97.8 F | WEIGHT: 274 LBS | HEIGHT: 69 IN | SYSTOLIC BLOOD PRESSURE: 128 MMHG | DIASTOLIC BLOOD PRESSURE: 78 MMHG | BODY MASS INDEX: 40.58 KG/M2

## 2021-09-09 DIAGNOSIS — Z09 POSTOPERATIVE EXAMINATION: Primary | ICD-10-CM

## 2021-09-09 PROCEDURE — 99024 POSTOP FOLLOW-UP VISIT: CPT | Performed by: SURGERY

## 2021-09-09 NOTE — PROGRESS NOTES
Postop Progress Note    Subjective    Kelli Higgins presents to the office for postop follow up, 2 weeks s/p laparoscopic robot assisted cholecystectomy. She reports that she has been doing well, denies any acute issues, is tolerating a diet, and post operative pain is improving. She does reports that, as before surgery, she is having acid reflux and strong smelling urine. Objective    Vitals:    09/09/21 1444   BP: 128/78   Temp: 97.8 °F (36.6 °C)     General: alert, cooperative and no distress  Abdomen: Soft, NT, ND, incisions C/D/I, no erythema or induration    Pathology:  FINAL DIAGNOSIS:     Gallbladder, cholecystectomy:   Chronic cholecystitis. Cholesterolosis. Cholelithiasis (gross only).    TS/TS    Assessment  44 yo female s/p laparoscopic robot assisted cholecystectomy  1) Doing well post operatively. Okay to have diet and activity as tolerated  2) Follow up in general surgery as needed  3) I discussed with her that I do not know what the strong smelling urine may be related to, but that she should follow up with her PCP regarding this. I also discussed with her that she has been on pepcid for a while, and she may need to try switching to a PPI. Again, she can follow up with her PCP regarding this, or if she would like, I could place a referral to GI since she has never had a GI workup. She will follow up with her PCP.     Electronically signed by Aleksandr Soriano MD on 9/9/2021 at 3:09 PM

## 2022-06-10 ENCOUNTER — TELEPHONE (OUTPATIENT)
Dept: VASCULAR SURGERY | Facility: CLINIC | Age: 52
End: 2022-06-10

## 2022-06-10 NOTE — TELEPHONE ENCOUNTER
Tried calling to remind Mrs Gusman of her appointment for Monday, June 13th, 2022 at 10 am with Victoria HUERTAS.     When calling rang twice someone picked up set on the line, I said hello twice then they hung up

## 2022-06-13 ENCOUNTER — OFFICE VISIT (OUTPATIENT)
Dept: VASCULAR SURGERY | Facility: CLINIC | Age: 52
End: 2022-06-13

## 2022-06-13 ENCOUNTER — PATIENT ROUNDING (BHMG ONLY) (OUTPATIENT)
Dept: VASCULAR SURGERY | Facility: CLINIC | Age: 52
End: 2022-06-13

## 2022-06-13 VITALS
WEIGHT: 276 LBS | OXYGEN SATURATION: 96 % | HEIGHT: 69 IN | DIASTOLIC BLOOD PRESSURE: 74 MMHG | HEART RATE: 77 BPM | SYSTOLIC BLOOD PRESSURE: 110 MMHG | BODY MASS INDEX: 40.88 KG/M2 | RESPIRATION RATE: 16 BRPM

## 2022-06-13 DIAGNOSIS — I87.323 CHRONIC VENOUS HYPERTENSION WITH INFLAMMATION INVOLVING BOTH SIDES: Primary | ICD-10-CM

## 2022-06-13 DIAGNOSIS — E78.5 HYPERLIPIDEMIA, UNSPECIFIED HYPERLIPIDEMIA TYPE: ICD-10-CM

## 2022-06-13 DIAGNOSIS — I10 PRIMARY HYPERTENSION: ICD-10-CM

## 2022-06-13 PROBLEM — M77.11 LATERAL EPICONDYLITIS OF RIGHT ELBOW: Status: ACTIVE | Noted: 2022-06-13

## 2022-06-13 PROCEDURE — 99204 OFFICE O/P NEW MOD 45 MIN: CPT | Performed by: NURSE PRACTITIONER

## 2022-06-13 RX ORDER — CHLORAL HYDRATE 500 MG
1 CAPSULE ORAL DAILY
COMMUNITY

## 2022-06-13 RX ORDER — SOLIFENACIN SUCCINATE 10 MG/1
1 TABLET, FILM COATED ORAL 2 TIMES DAILY PRN
COMMUNITY
Start: 2022-05-27

## 2022-06-13 RX ORDER — GABAPENTIN 300 MG/1
300 CAPSULE ORAL 3 TIMES DAILY
COMMUNITY
Start: 2022-03-25 | End: 2022-07-12

## 2022-06-13 RX ORDER — AMLODIPINE BESYLATE 10 MG/1
10 TABLET ORAL DAILY
COMMUNITY
Start: 2022-03-21

## 2022-06-13 RX ORDER — HYDROCHLOROTHIAZIDE 25 MG/1
1 TABLET ORAL 2 TIMES DAILY
COMMUNITY
Start: 2022-05-26

## 2022-06-13 RX ORDER — CARVEDILOL 3.12 MG/1
1 TABLET ORAL 2 TIMES DAILY
COMMUNITY
Start: 2022-05-27

## 2022-06-13 RX ORDER — FAMOTIDINE 10 MG
1 TABLET ORAL DAILY
COMMUNITY

## 2022-06-13 RX ORDER — LOSARTAN POTASSIUM 100 MG/1
1 TABLET ORAL DAILY
COMMUNITY
Start: 2022-05-26

## 2022-06-13 RX ORDER — CETIRIZINE HYDROCHLORIDE 10 MG/1
1 TABLET ORAL DAILY
COMMUNITY

## 2022-06-13 RX ORDER — RIVAROXABAN 15 MG/1
1 TABLET, FILM COATED ORAL 2 TIMES DAILY
COMMUNITY
Start: 2022-05-27 | End: 2022-07-12

## 2022-06-13 NOTE — PROGRESS NOTES
June 13, 2022    Hello, may I speak with Lizabeth Gusman?    My name is Louise.      I am  with [facility]  [address], [address]  [place] [address] [phone].    Before we get started may I verify your date of birth? 1970    I am calling to officially welcome you to our practice and ask about your recent visit. Is this a good time to talk? yes    Tell me about your visit with us. What things went well?  It went really well.       We're always looking for ways to make our patients' experiences even better. Do you have recommendations on ways we may improve?  no    Overall were you satisfied with your first visit to our practice? Yes, it was good.       I appreciate you taking the time to speak with me today. Is there anything else I can do for you? no      Thank you, and have a great day.

## 2022-06-13 NOTE — PROGRESS NOTES
06/13/2022      Lashell Gusman APRN  [address] [place] [address]    Lizabeth Gusman  1970    Chief Complaint   Patient presents with   • Establish Care     Referral by ALEKSANDAR Horvath for thrombosis within the greater saphenous veins B/L. Test 5/27/22 comp. Pt has swelling in both legs. Pt has never smoked.  Pt denies any stroke like symptoms.       Dear ALEKSANDAR Horvath:      HPI  I had the pleasure of seeing your patient Lizabeth Gusman in the office today.  Thank you kindly for this consultation.  As you recall, Lizabeth Gusman is a 52 y.o.  female who you are currently following for routine health maintenance.  She has a history of bilateral vein procedures in [place].  She recently had back surgery.  About 2 days following her procedure she developed redness and tenderness to her right thigh then to her left thigh as well.  She was sent for imaging showing bilateral superficial phlebitis with no evidence of DVT.  She was placed on Xarelto at that time.  The erythema has decreased but .  She does have swelling to bilateral lower extremities.    Past Medical History:   Diagnosis Date   • GERD (gastroesophageal reflux disease)    • Hyperlipidemia    • Hypertension        Past Surgical History:   Procedure Laterality Date   • BACK SURGERY  05/17/2022       Family History   Problem Relation Age of Onset   • Stroke Mother    • Anuerysm Mother    • Coronary artery disease Father    • Atrial fibrillation Father        Social History     Socioeconomic History   • Marital status:    Tobacco Use   • Smoking status: Never Smoker   • Smokeless tobacco: Never Used   Substance and Sexual Activity   • Alcohol use: Yes     Comment: Rarely   • Drug use: Never   • Sexual activity: Defer       Allergies   Allergen Reactions   • Sulfa Antibiotics Rash       Current Outpatient Medications   Medication Instructions   • amLODIPine (NORVASC) 10 mg, Oral, Daily   • carvedilol (COREG) 3.125 MG  "tablet 1 tablet, 2 Times Daily   • cetirizine (zyrTEC) 10 MG tablet 1 tablet, Oral, Daily   • famotidine (PEPCID) 10 MG tablet 1 tablet, Daily   • gabapentin (NEURONTIN) 300 mg, Oral, 3 Times Daily   • hydroCHLOROthiazide (HYDRODIURIL) 25 MG tablet 1 tablet, 2 Times Daily   • losartan (COZAAR) 100 MG tablet 1 tablet, Daily   • Omega-3 Fatty Acids (fish oil) 1000 MG capsule capsule 1 capsule, Daily   • solifenacin (VESICARE) 10 MG tablet 1 tablet, 2 Times Daily PRN   • VITAMIN D PO 5,000 Units, Oral, Daily   • Xarelto 15 MG tablet 1 tablet, 2 Times Daily         Review of Systems   Constitutional: Negative.    HENT: Negative.    Eyes: Negative.    Respiratory: Negative.    Cardiovascular: Positive for leg swelling.        Leg cramping   Gastrointestinal: Negative.    Endocrine: Negative.    Genitourinary: Negative.    Musculoskeletal: Negative.    Skin: Negative.    Allergic/Immunologic: Negative.    Neurological: Negative.    Hematological: Negative.    Psychiatric/Behavioral: Negative.        /74 (BP Location: Left arm, Patient Position: Sitting, Cuff Size: Adult)   Pulse 77   Resp 16   Ht 175.3 cm (69\")   Wt 125 kg (276 lb)   SpO2 96%   BMI 40.76 kg/m²   Physical Exam  Vitals and nursing note reviewed.   Constitutional:       General: She is not in acute distress.     Appearance: Normal appearance. She is well-developed. She is obese. She is not diaphoretic.   HENT:      Head: Normocephalic and atraumatic.   Eyes:      General: No scleral icterus.     Pupils: Pupils are equal, round, and reactive to light.   Neck:      Thyroid: No thyromegaly.      Vascular: No carotid bruit or JVD.   Cardiovascular:      Rate and Rhythm: Normal rate and regular rhythm.      Pulses: Normal pulses.      Heart sounds: Normal heart sounds and S2 normal. No murmur heard.    No friction rub. No gallop.      Comments: Superficial thrombophlebitis to zulma thighs  Pulmonary:      Effort: Pulmonary effort is normal.      Breath " sounds: Normal breath sounds.   Abdominal:      General: Bowel sounds are normal.      Palpations: Abdomen is soft.   Musculoskeletal:         General: Normal range of motion.      Cervical back: Normal range of motion and neck supple.      Right lower leg: Edema present.      Left lower leg: Edema present.      Comments: Back brace in place   Skin:     General: Skin is warm and dry.   Neurological:      General: No focal deficit present.      Mental Status: She is alert and oriented to person, place, and time.      Cranial Nerves: No cranial nerve deficit.   Psychiatric:         Mood and Affect: Mood normal.         Behavior: Behavior normal.         Thought Content: Thought content normal.         Judgment: Judgment normal.             Patient Active Problem List   Diagnosis   • Lateral epicondylitis of right elbow   • Hyperlipidemia   • Hypertension         ICD-10-CM ICD-9-CM   1. Chronic venous hypertension with inflammation involving both sides  I87.323 459.32   2. Hyperlipidemia, unspecified hyperlipidemia type  E78.5 272.4   3. Primary hypertension  I10 401.9         Plan: After thoroughly evaluating Lizabeth Gusman, I believe the best course of action is to initially remain conservative from vascular surgery standpoint.  I did review her testing which shows no evidence of DVT and thus she really does not need the Xarelto.  I have asked her to finish her prescription and then she can take aspirin 81 mg enteric-coated.  She does have symptoms consistent with venous insufficiency with continued swelling.  I will have her return in about a month with a venous valvular insufficiency study to see what is open from previous venous procedures.  It sounds like she is either had laser or ablation to both legs as well as stab phlebectomy to the left leg.  I did give her a prescription for compression stockings and instructed her on how to wear these on a daily basis and discussion regarding donning aids as she just had  recent back surgery.  She is also taking Norvasc which can be contributing to her significant leg swelling.  I do think she would be a good candidate for home lymphedema pumps and will have her see our tactile wrap when she returns in 1 month.  The patient can continue taking their current medication regimen as previously planned.  This was all discussed in full with complete understanding.    Thank you for allowing me to participate in the care of your patient.  Please do not hesitate with any questions or concerns.  I will keep you aware of any further encounters with Lizabeth Gusman.        Sincerely yours,         ALEKSANDAR Anderson

## 2022-07-11 ENCOUNTER — TELEPHONE (OUTPATIENT)
Dept: VASCULAR SURGERY | Facility: CLINIC | Age: 52
End: 2022-07-11

## 2022-07-12 ENCOUNTER — OFFICE VISIT (OUTPATIENT)
Dept: VASCULAR SURGERY | Facility: CLINIC | Age: 52
End: 2022-07-12

## 2022-07-12 ENCOUNTER — HOSPITAL ENCOUNTER (OUTPATIENT)
Dept: ULTRASOUND IMAGING | Facility: HOSPITAL | Age: 52
Discharge: HOME | End: 2022-07-12
Admitting: NURSE PRACTITIONER

## 2022-07-12 VITALS
RESPIRATION RATE: 16 BRPM | HEIGHT: 69 IN | BODY MASS INDEX: 41.92 KG/M2 | SYSTOLIC BLOOD PRESSURE: 114 MMHG | WEIGHT: 283 LBS | DIASTOLIC BLOOD PRESSURE: 72 MMHG | OXYGEN SATURATION: 98 % | HEART RATE: 81 BPM

## 2022-07-12 DIAGNOSIS — I87.323 CHRONIC VENOUS HYPERTENSION WITH INFLAMMATION INVOLVING BOTH SIDES: Primary | ICD-10-CM

## 2022-07-12 DIAGNOSIS — Z01.818 PREOP TESTING: ICD-10-CM

## 2022-07-12 DIAGNOSIS — I87.323 CHRONIC VENOUS HYPERTENSION WITH INFLAMMATION INVOLVING BOTH SIDES: ICD-10-CM

## 2022-07-12 DIAGNOSIS — Z51.81 ENCOUNTER FOR MONITORING ANTIPLATELET THERAPY: ICD-10-CM

## 2022-07-12 DIAGNOSIS — Z79.02 ENCOUNTER FOR MONITORING ANTIPLATELET THERAPY: ICD-10-CM

## 2022-07-12 PROCEDURE — 93970 EXTREMITY STUDY: CPT

## 2022-07-12 PROCEDURE — 93970 EXTREMITY STUDY: CPT | Performed by: SURGERY

## 2022-07-12 PROCEDURE — 99214 OFFICE O/P EST MOD 30 MIN: CPT | Performed by: NURSE PRACTITIONER

## 2022-07-12 RX ORDER — PREGABALIN 75 MG/1
75 CAPSULE ORAL 2 TIMES DAILY
COMMUNITY

## 2022-07-12 RX ORDER — ASPIRIN 81 MG/1
81 TABLET ORAL DAILY
COMMUNITY

## 2022-07-12 NOTE — PROGRESS NOTES
"7/12/2022       Lashell Gusman APRN  [address] [facility] [address]    Lizabeth Gusman  1970    Chief Complaint   Patient presents with   • Chronic Venous Hypertension     1 month follow-up with US pad  lower ext bilateral 7/12/22.  Pt Has Never Smoked. Pt states she has swelling in both legs.        Dear Lashell Gusman APRN     HPI  I had the pleasure of seeing your patient Lizabeth Gusman in the office today.   As you recall, Lizabeth Gusman is a 52 y.o.  female who you are currently following for routine health maintenance.  She has a history of bilateral vein procedures in [place].  She did have back surgery recently and 2 days following her procedure developed some superficial phlebitis to bilateral thighs.  At that time imaging did show bilateral superficial phlebitis.  She does have chronic swelling to her lower extremities with heaviness, tiredness, and aching.  She has been wearing compression stockings without significant relief.  She did have noninvasive testing performed today, which I did review in office.        Review of Systems   Constitutional: Negative.    HENT: Negative.    Eyes: Negative.    Respiratory: Negative.    Cardiovascular: Positive for leg swelling.        Leg cramping   Gastrointestinal: Negative.    Endocrine: Negative.    Genitourinary: Negative.    Musculoskeletal: Negative.    Skin: Negative.    Allergic/Immunologic: Negative.    Neurological: Negative.    Hematological: Negative.    Psychiatric/Behavioral: Negative.        /72 (BP Location: Left arm, Patient Position: Sitting, Cuff Size: Adult)   Pulse 81   Resp 16   Ht 175.3 cm (69\")   Wt 128 kg (283 lb)   SpO2 98%   BMI 41.79 kg/m²   Physical Exam  Vitals and nursing note reviewed.   Constitutional:       General: She is not in acute distress.     Appearance: Normal appearance. She is well-developed. She is obese. She is not diaphoretic.   HENT:      Head: Normocephalic and atraumatic.   Eyes:      General: " No scleral icterus.     Pupils: Pupils are equal, round, and reactive to light.   Neck:      Thyroid: No thyromegaly.      Vascular: No carotid bruit or JVD.   Cardiovascular:      Rate and Rhythm: Normal rate and regular rhythm.      Pulses: Normal pulses.      Heart sounds: Normal heart sounds and S2 normal. No murmur heard.    No friction rub. No gallop.   Pulmonary:      Effort: Pulmonary effort is normal.      Breath sounds: Normal breath sounds.   Abdominal:      General: Bowel sounds are normal.      Palpations: Abdomen is soft.   Musculoskeletal:      Cervical back: Normal range of motion and neck supple.      Right lower leg: Edema present.      Left lower leg: Edema present.      Comments: Recent back surgery   Skin:     General: Skin is warm and dry.      Comments: hyperpigmentation   Neurological:      General: No focal deficit present.      Mental Status: She is alert and oriented to person, place, and time.      Cranial Nerves: No cranial nerve deficit.   Psychiatric:         Mood and Affect: Mood normal.         Behavior: Behavior normal.         Thought Content: Thought content normal.         Judgment: Judgment normal.            Diagnostic Data:    US Venous Doppler Lower Extremity Bilateral (duplex)    Result Date: 7/12/2022  Narrative: History: Swelling   Comments: Venous valvular insufficiency testing was performed in the bilateral lower extremities using duplex ultrasound with compression techniques.  The common femoral vein, superficial femoral, popliteal vein, posterior tibial vein, peroneal vein, greater saphenous vein, and lesser saphenous veins were interrogated.  On the right, the greater saphenous vein at the junction measured 5.9mm. In the mid thigh measured 4.7mm. Above the knee measured 5.6mm. In the mid calf measured 2.7mm. At the ankle measured 3.6mm. There is greater than 0.5 seconds of reflux from the junction to the knee. The lesser saphenous vein is within normal limits and no  evidence of reflux. There is no evidence of DVT. Anterior accessory branch measures 5.9 mm and has greater than 0.5 seconds of reflux. It also is thrombosed from the proximal to mid thigh. There is a large posterior branch that runs superficial from the mid thigh to the proximal calf that connects back to the greater saphenous vein.  On the left, the greater saphenous vein at the junction measured 6.1mm. From the proximal thigh to the knee the vein was not visualized. In the mid calf measured 2.5mm. At the ankle measured 2.2mm. There is greater than 0.5 seconds of reflux at the junction. The lesser saphenous vein is within normal limits and no evidence of reflux. There is no evidence of DVT. Anterior accessory branch measures 6.5 mm and has greater than 0.5 seconds of reflux. There is a large varicose vein cluster at the saphenofemoral junction extending into the proximal anterior thigh.      Impression: Impression: There is evidence of venous insufficiency in both lower extremity greater saphenous veins right greater than left. There is also evidence of reflux in both anterior accessory saphenous veins. The right anterior accessory vein has thrombus from the proximal to mid thigh. The patient has had a history of RFA's bilaterally as well as stab phlebectomys.  This report was finalized on 07/12/2022 16:38 by Dr. Roman Groves MD.       Patient Active Problem List   Diagnosis   • Lateral epicondylitis of right elbow   • Hyperlipidemia   • Hypertension         ICD-10-CM ICD-9-CM   1. Chronic venous hypertension with inflammation involving both sides  I87.323 459.32   2. Preop testing  Z01.818 V72.84   3. Encounter for monitoring antiplatelet therapy  Z51.81 V58.83    Z79.02 V58.63         Plan: After thoroughly evaluating Lizabeth MAHESH Headleyks, I believe the best course of action is to proceed with radiofrequency ablation of the right lower extremity greater saphenous vein as well as posterior branch.  I did review her  testing which does show significant venous insufficiency to her right lower extremity. Risks of radiofrequency ablation include, but are not limited to, bleeding, infection, vessel damage, nerve damage, DVT, phlebitis, and pulmonary embolus.  The patient understands these risks and wishes to proceed with procedure.  Her left lower extremity would require staff lobectomies if needed however we will see how she does with her right lower extremity first.  She is also taking Norvasc which can be contributing to her significant leg swelling.  I do think she would be a good candidate for home lymphedema pumps.  Despite greater than 4 weeks of compression 20 to 30 mmHg, elevation, and exercise she continues to have swelling to her lower extremities.  The patient can continue taking their current medication regimen as previously planned.  This was all discussed in full with complete understanding.    Thank you for allowing me to participate in the care of your patient.  Please do not hesitate with any questions or concerns.  I will keep you aware of any further encounters with Lizabeth Gusman.        Sincerely yours,         ALEKSANDAR Anderson

## 2022-07-18 ENCOUNTER — TELEPHONE (OUTPATIENT)
Dept: VASCULAR SURGERY | Facility: CLINIC | Age: 52
End: 2022-07-18

## 2022-07-18 PROBLEM — I87.323 CHRONIC VENOUS HYPERTENSION WITH INFLAMMATION INVOLVING BOTH SIDES: Status: ACTIVE | Noted: 2022-07-18

## 2022-07-18 PROBLEM — Z01.818 PREOP TESTING: Status: ACTIVE | Noted: 2022-07-18

## 2022-07-18 NOTE — TELEPHONE ENCOUNTER
SPOKE WITH PATIENT REGARDING SURGERY. PT WAS INFORMED OF PRE WORK ON 07/26 AT 0745. PT WAS ALSO INFORMED OF SURGERY ON 07/29 AT 0600. PT WAS INFORMED OF COVID TEST AND VISITATION. PT STATED UNDERSTANDING OF INSTRUCTIONS AND ALL INFORMATION.

## 2022-07-26 ENCOUNTER — PRE-ADMISSION TESTING (OUTPATIENT)
Dept: PREADMISSION TESTING | Facility: HOSPITAL | Age: 52
End: 2022-07-26

## 2022-07-26 ENCOUNTER — LAB (OUTPATIENT)
Dept: LAB | Facility: HOSPITAL | Age: 52
End: 2022-07-26

## 2022-07-26 VITALS
HEART RATE: 90 BPM | BODY MASS INDEX: 41.8 KG/M2 | DIASTOLIC BLOOD PRESSURE: 96 MMHG | HEIGHT: 69 IN | SYSTOLIC BLOOD PRESSURE: 137 MMHG | WEIGHT: 282.19 LBS | RESPIRATION RATE: 18 BRPM | OXYGEN SATURATION: 97 %

## 2022-07-26 DIAGNOSIS — I87.323 CHRONIC VENOUS HYPERTENSION WITH INFLAMMATION INVOLVING BOTH SIDES: ICD-10-CM

## 2022-07-26 DIAGNOSIS — Z51.81 ENCOUNTER FOR MONITORING ANTIPLATELET THERAPY: ICD-10-CM

## 2022-07-26 DIAGNOSIS — Z79.02 ENCOUNTER FOR MONITORING ANTIPLATELET THERAPY: ICD-10-CM

## 2022-07-26 DIAGNOSIS — Z01.818 PREOP TESTING: ICD-10-CM

## 2022-07-26 LAB
ANION GAP SERPL CALCULATED.3IONS-SCNC: 4 MMOL/L (ref 5–15)
APTT PPP: 28.3 SECONDS (ref 24.1–35)
BASOPHILS # BLD AUTO: 0.04 10*3/MM3 (ref 0–0.2)
BASOPHILS NFR BLD AUTO: 0.5 % (ref 0–1.5)
BUN SERPL-MCNC: 9 MG/DL (ref 6–20)
BUN/CREAT SERPL: 13.6 (ref 7–25)
CALCIUM SPEC-SCNC: 9.7 MG/DL (ref 8.6–10.5)
CHLORIDE SERPL-SCNC: 105 MMOL/L (ref 98–107)
CO2 SERPL-SCNC: 31 MMOL/L (ref 22–29)
CREAT SERPL-MCNC: 0.66 MG/DL (ref 0.57–1)
DEPRECATED RDW RBC AUTO: 42.4 FL (ref 37–54)
EGFRCR SERPLBLD CKD-EPI 2021: 105 ML/MIN/1.73
EOSINOPHIL # BLD AUTO: 0.19 10*3/MM3 (ref 0–0.4)
EOSINOPHIL NFR BLD AUTO: 2.3 % (ref 0.3–6.2)
ERYTHROCYTE [DISTWIDTH] IN BLOOD BY AUTOMATED COUNT: 12.7 % (ref 12.3–15.4)
GLUCOSE SERPL-MCNC: 121 MG/DL (ref 65–99)
HCT VFR BLD AUTO: 43.8 % (ref 34–46.6)
HGB BLD-MCNC: 14.2 G/DL (ref 12–15.9)
IMM GRANULOCYTES # BLD AUTO: 0.05 10*3/MM3 (ref 0–0.05)
IMM GRANULOCYTES NFR BLD AUTO: 0.6 % (ref 0–0.5)
INR PPP: 1.13 (ref 0.91–1.09)
LYMPHOCYTES # BLD AUTO: 1.74 10*3/MM3 (ref 0.7–3.1)
LYMPHOCYTES NFR BLD AUTO: 21.3 % (ref 19.6–45.3)
MCH RBC QN AUTO: 29.7 PG (ref 26.6–33)
MCHC RBC AUTO-ENTMCNC: 32.4 G/DL (ref 31.5–35.7)
MCV RBC AUTO: 91.6 FL (ref 79–97)
MONOCYTES # BLD AUTO: 0.56 10*3/MM3 (ref 0.1–0.9)
MONOCYTES NFR BLD AUTO: 6.8 % (ref 5–12)
NEUTROPHILS NFR BLD AUTO: 5.6 10*3/MM3 (ref 1.7–7)
NEUTROPHILS NFR BLD AUTO: 68.5 % (ref 42.7–76)
NRBC BLD AUTO-RTO: 0 /100 WBC (ref 0–0.2)
PLATELET # BLD AUTO: 318 10*3/MM3 (ref 140–450)
PMV BLD AUTO: 10.4 FL (ref 6–12)
POTASSIUM SERPL-SCNC: 3.8 MMOL/L (ref 3.5–5.2)
PROTHROMBIN TIME: 14 SECONDS (ref 11.9–14.6)
RBC # BLD AUTO: 4.78 10*6/MM3 (ref 3.77–5.28)
SARS-COV-2 ORF1AB RESP QL NAA+PROBE: NOT DETECTED
SODIUM SERPL-SCNC: 140 MMOL/L (ref 136–145)
WBC NRBC COR # BLD: 8.18 10*3/MM3 (ref 3.4–10.8)

## 2022-07-26 PROCEDURE — 93005 ELECTROCARDIOGRAM TRACING: CPT

## 2022-07-26 PROCEDURE — 85610 PROTHROMBIN TIME: CPT

## 2022-07-26 PROCEDURE — 93010 ELECTROCARDIOGRAM REPORT: CPT | Performed by: INTERNAL MEDICINE

## 2022-07-26 PROCEDURE — 80048 BASIC METABOLIC PNL TOTAL CA: CPT

## 2022-07-26 PROCEDURE — C9803 HOPD COVID-19 SPEC COLLECT: HCPCS

## 2022-07-26 PROCEDURE — U0004 COV-19 TEST NON-CDC HGH THRU: HCPCS

## 2022-07-26 PROCEDURE — 85025 COMPLETE CBC W/AUTO DIFF WBC: CPT

## 2022-07-26 PROCEDURE — 36415 COLL VENOUS BLD VENIPUNCTURE: CPT

## 2022-07-26 PROCEDURE — 85730 THROMBOPLASTIN TIME PARTIAL: CPT

## 2022-07-27 LAB
QT INTERVAL: 410 MS
QTC INTERVAL: 439 MS

## 2022-07-28 ENCOUNTER — TELEPHONE (OUTPATIENT)
Dept: VASCULAR SURGERY | Facility: CLINIC | Age: 52
End: 2022-07-28

## 2022-07-29 ENCOUNTER — HOSPITAL ENCOUNTER (OUTPATIENT)
Facility: HOSPITAL | Age: 52
Setting detail: HOSPITAL OUTPATIENT SURGERY
Discharge: HOME | End: 2022-07-29
Attending: SURGERY | Admitting: SURGERY

## 2022-07-29 ENCOUNTER — ANESTHESIA (OUTPATIENT)
Dept: PERIOP | Facility: HOSPITAL | Age: 52
End: 2022-07-29

## 2022-07-29 ENCOUNTER — ANESTHESIA EVENT (OUTPATIENT)
Dept: PERIOP | Facility: HOSPITAL | Age: 52
End: 2022-07-29

## 2022-07-29 ENCOUNTER — APPOINTMENT (OUTPATIENT)
Dept: ULTRASOUND IMAGING | Facility: HOSPITAL | Age: 52
End: 2022-07-29

## 2022-07-29 VITALS
SYSTOLIC BLOOD PRESSURE: 118 MMHG | DIASTOLIC BLOOD PRESSURE: 85 MMHG | HEART RATE: 52 BPM | RESPIRATION RATE: 16 BRPM | TEMPERATURE: 96.9 F | OXYGEN SATURATION: 96 %

## 2022-07-29 DIAGNOSIS — I87.323 CHRONIC VENOUS HYPERTENSION WITH INFLAMMATION INVOLVING BOTH SIDES: ICD-10-CM

## 2022-07-29 DIAGNOSIS — Z01.818 PREOP TESTING: ICD-10-CM

## 2022-07-29 LAB — B-HCG UR QL: NEGATIVE

## 2022-07-29 PROCEDURE — C1888 ENDOVAS NON-CARDIAC ABL CATH: HCPCS | Performed by: SURGERY

## 2022-07-29 PROCEDURE — 25010000002 CEFAZOLIN PER 500 MG: Performed by: NURSE PRACTITIONER

## 2022-07-29 PROCEDURE — 25010000002 FENTANYL CITRATE (PF) 100 MCG/2ML SOLUTION: Performed by: NURSE ANESTHETIST, CERTIFIED REGISTERED

## 2022-07-29 PROCEDURE — 25010000002 PROPOFOL 1000 MG/100ML EMULSION: Performed by: NURSE ANESTHETIST, CERTIFIED REGISTERED

## 2022-07-29 PROCEDURE — 36475 ENDOVENOUS RF 1ST VEIN: CPT | Performed by: SURGERY

## 2022-07-29 PROCEDURE — C1894 INTRO/SHEATH, NON-LASER: HCPCS | Performed by: SURGERY

## 2022-07-29 PROCEDURE — 76937 US GUIDE VASCULAR ACCESS: CPT

## 2022-07-29 PROCEDURE — 81025 URINE PREGNANCY TEST: CPT | Performed by: SURGERY

## 2022-07-29 PROCEDURE — 25010000002 PROPOFOL 10 MG/ML EMULSION: Performed by: NURSE ANESTHETIST, CERTIFIED REGISTERED

## 2022-07-29 RX ORDER — FLUMAZENIL 0.1 MG/ML
0.2 INJECTION INTRAVENOUS AS NEEDED
Status: DISCONTINUED | OUTPATIENT
Start: 2022-07-29 | End: 2022-07-29 | Stop reason: HOSPADM

## 2022-07-29 RX ORDER — LIDOCAINE HYDROCHLORIDE 20 MG/ML
INJECTION, SOLUTION EPIDURAL; INFILTRATION; INTRACAUDAL; PERINEURAL AS NEEDED
Status: DISCONTINUED | OUTPATIENT
Start: 2022-07-29 | End: 2022-07-29 | Stop reason: SURG

## 2022-07-29 RX ORDER — FENTANYL CITRATE 50 UG/ML
INJECTION, SOLUTION INTRAMUSCULAR; INTRAVENOUS AS NEEDED
Status: DISCONTINUED | OUTPATIENT
Start: 2022-07-29 | End: 2022-07-29 | Stop reason: SURG

## 2022-07-29 RX ORDER — DROPERIDOL 2.5 MG/ML
0.62 INJECTION, SOLUTION INTRAMUSCULAR; INTRAVENOUS ONCE AS NEEDED
Status: DISCONTINUED | OUTPATIENT
Start: 2022-07-29 | End: 2022-07-29 | Stop reason: HOSPADM

## 2022-07-29 RX ORDER — OXYCODONE AND ACETAMINOPHEN 10; 325 MG/1; MG/1
1 TABLET ORAL ONCE AS NEEDED
Status: DISCONTINUED | OUTPATIENT
Start: 2022-07-29 | End: 2022-07-29 | Stop reason: HOSPADM

## 2022-07-29 RX ORDER — PROPOFOL 10 MG/ML
INJECTION, EMULSION INTRAVENOUS CONTINUOUS PRN
Status: DISCONTINUED | OUTPATIENT
Start: 2022-07-29 | End: 2022-07-29 | Stop reason: SURG

## 2022-07-29 RX ORDER — ONDANSETRON 2 MG/ML
4 INJECTION INTRAMUSCULAR; INTRAVENOUS ONCE AS NEEDED
Status: DISCONTINUED | OUTPATIENT
Start: 2022-07-29 | End: 2022-07-29 | Stop reason: HOSPADM

## 2022-07-29 RX ORDER — HYDROCODONE BITARTRATE AND ACETAMINOPHEN 5; 325 MG/1; MG/1
1 TABLET ORAL ONCE AS NEEDED
Status: DISCONTINUED | OUTPATIENT
Start: 2022-07-29 | End: 2022-07-29 | Stop reason: HOSPADM

## 2022-07-29 RX ORDER — SODIUM CHLORIDE 0.9 % (FLUSH) 0.9 %
3-10 SYRINGE (ML) INJECTION AS NEEDED
Status: DISCONTINUED | OUTPATIENT
Start: 2022-07-29 | End: 2022-07-29 | Stop reason: HOSPADM

## 2022-07-29 RX ORDER — LABETALOL HYDROCHLORIDE 5 MG/ML
5 INJECTION, SOLUTION INTRAVENOUS
Status: DISCONTINUED | OUTPATIENT
Start: 2022-07-29 | End: 2022-07-29 | Stop reason: HOSPADM

## 2022-07-29 RX ORDER — HYDROCODONE BITARTRATE AND ACETAMINOPHEN 5; 325 MG/1; MG/1
1 TABLET ORAL EVERY 6 HOURS PRN
Qty: 10 TABLET | Refills: 0 | Status: SHIPPED | OUTPATIENT
Start: 2022-07-29 | End: 2022-08-05

## 2022-07-29 RX ORDER — PROPOFOL 10 MG/ML
VIAL (ML) INTRAVENOUS AS NEEDED
Status: DISCONTINUED | OUTPATIENT
Start: 2022-07-29 | End: 2022-07-29 | Stop reason: SURG

## 2022-07-29 RX ORDER — SODIUM CHLORIDE, SODIUM LACTATE, POTASSIUM CHLORIDE, CALCIUM CHLORIDE 600; 310; 30; 20 MG/100ML; MG/100ML; MG/100ML; MG/100ML
100 INJECTION, SOLUTION INTRAVENOUS CONTINUOUS
Status: DISCONTINUED | OUTPATIENT
Start: 2022-07-29 | End: 2022-07-29 | Stop reason: HOSPADM

## 2022-07-29 RX ORDER — NALOXONE HCL 0.4 MG/ML
0.4 VIAL (ML) INJECTION AS NEEDED
Status: DISCONTINUED | OUTPATIENT
Start: 2022-07-29 | End: 2022-07-29 | Stop reason: HOSPADM

## 2022-07-29 RX ORDER — LIDOCAINE HYDROCHLORIDE 10 MG/ML
0.5 INJECTION, SOLUTION EPIDURAL; INFILTRATION; INTRACAUDAL; PERINEURAL ONCE AS NEEDED
Status: DISCONTINUED | OUTPATIENT
Start: 2022-07-29 | End: 2022-07-29 | Stop reason: HOSPADM

## 2022-07-29 RX ORDER — IBUPROFEN 600 MG/1
600 TABLET ORAL ONCE AS NEEDED
Status: DISCONTINUED | OUTPATIENT
Start: 2022-07-29 | End: 2022-07-29 | Stop reason: HOSPADM

## 2022-07-29 RX ORDER — SODIUM CHLORIDE 0.9 % (FLUSH) 0.9 %
3 SYRINGE (ML) INJECTION AS NEEDED
Status: DISCONTINUED | OUTPATIENT
Start: 2022-07-29 | End: 2022-07-29 | Stop reason: HOSPADM

## 2022-07-29 RX ORDER — FENTANYL CITRATE 50 UG/ML
25 INJECTION, SOLUTION INTRAMUSCULAR; INTRAVENOUS
Status: DISCONTINUED | OUTPATIENT
Start: 2022-07-29 | End: 2022-07-29 | Stop reason: HOSPADM

## 2022-07-29 RX ORDER — OXYCODONE AND ACETAMINOPHEN 7.5; 325 MG/1; MG/1
2 TABLET ORAL EVERY 4 HOURS PRN
Status: DISCONTINUED | OUTPATIENT
Start: 2022-07-29 | End: 2022-07-29 | Stop reason: HOSPADM

## 2022-07-29 RX ORDER — SODIUM CHLORIDE 0.9 % (FLUSH) 0.9 %
3 SYRINGE (ML) INJECTION EVERY 12 HOURS SCHEDULED
Status: DISCONTINUED | OUTPATIENT
Start: 2022-07-29 | End: 2022-07-29 | Stop reason: HOSPADM

## 2022-07-29 RX ORDER — SODIUM CHLORIDE 9 MG/ML
INJECTION, SOLUTION INTRAVENOUS AS NEEDED
Status: DISCONTINUED | OUTPATIENT
Start: 2022-07-29 | End: 2022-07-29 | Stop reason: HOSPADM

## 2022-07-29 RX ORDER — SODIUM CHLORIDE, SODIUM LACTATE, POTASSIUM CHLORIDE, CALCIUM CHLORIDE 600; 310; 30; 20 MG/100ML; MG/100ML; MG/100ML; MG/100ML
1000 INJECTION, SOLUTION INTRAVENOUS CONTINUOUS
Status: DISCONTINUED | OUTPATIENT
Start: 2022-07-29 | End: 2022-07-29 | Stop reason: HOSPADM

## 2022-07-29 RX ADMIN — SODIUM CHLORIDE, POTASSIUM CHLORIDE, SODIUM LACTATE AND CALCIUM CHLORIDE 1000 ML: 600; 310; 30; 20 INJECTION, SOLUTION INTRAVENOUS at 08:18

## 2022-07-29 RX ADMIN — PROPOFOL 50 MG: 10 INJECTION, EMULSION INTRAVENOUS at 09:40

## 2022-07-29 RX ADMIN — FENTANYL CITRATE 50 MCG: 50 INJECTION, SOLUTION INTRAMUSCULAR; INTRAVENOUS at 09:44

## 2022-07-29 RX ADMIN — FENTANYL CITRATE 50 MCG: 50 INJECTION, SOLUTION INTRAMUSCULAR; INTRAVENOUS at 09:40

## 2022-07-29 RX ADMIN — LIDOCAINE HYDROCHLORIDE 100 MG: 20 INJECTION, SOLUTION EPIDURAL; INFILTRATION; INTRACAUDAL; PERINEURAL at 09:40

## 2022-07-29 RX ADMIN — PROPOFOL 150 MCG/KG/MIN: 10 INJECTION, EMULSION INTRAVENOUS at 09:41

## 2022-07-29 NOTE — ANESTHESIA PREPROCEDURE EVALUATION
Anesthesia Evaluation     Patient summary reviewed and Nursing notes reviewed   no history of anesthetic complications:  NPO Solid Status: > 8 hours  NPO Liquid Status: > 8 hours           Airway   Mallampati: I  TM distance: >3 FB  Neck ROM: full  No difficulty expected  Dental      Pulmonary    (-) not a smoker  Cardiovascular   Exercise tolerance: good (4-7 METS)    ECG reviewed    (+) hypertension, DVT, hyperlipidemia,   (-) CAD      Neuro/Psych  (-) seizures, TIA, CVA  GI/Hepatic/Renal/Endo    (+) morbid obesity, GERD,  renal disease CRI,   (-) liver disease, diabetes    Musculoskeletal     Abdominal    Substance History      OB/GYN          Other   arthritis,                      Anesthesia Plan    ASA 3     MAC     intravenous induction     Anesthetic plan, risks, benefits, and alternatives have been provided, discussed and informed consent has been obtained with: patient.        CODE STATUS:

## 2022-07-29 NOTE — ANESTHESIA POSTPROCEDURE EVALUATION
Patient: Lizabeth Gusman    Procedure Summary     Date: 07/29/22 Room / Location: [facility] OR  / [facility] HYBRID OR 12    Anesthesia Start: 0936 Anesthesia Stop: 1029    Procedure: RIGHT SAPHENOUS VEIN RADIO FREQUENCY ABLATION and posterior branch (N/A Leg Lower) Diagnosis:       Chronic venous hypertension with inflammation involving both sides      Preop testing      (Chronic venous hypertension with inflammation involving both sides [I87.323])      (Preop testing [Z01.818])    Surgeons: Roman Groves DO Provider: Aurelio Fisher CRNA    Anesthesia Type: MAC ASA Status: 3          Anesthesia Type: MAC    Vitals  Vitals Value Taken Time   /76 07/29/22 1028   Temp     Pulse 74 07/29/22 1032   Resp     SpO2 93 % 07/29/22 1032   Vitals shown include unvalidated device data.        Post Anesthesia Care and Evaluation    Patient location during evaluation: PHASE II  Patient participation: complete - patient participated  Level of consciousness: awake and alert  Pain score: 0  Pain management: adequate    Airway patency: patent  Anesthetic complications: No anesthetic complications  PONV Status: none  Cardiovascular status: acceptable  Respiratory status: acceptable  Hydration status: acceptable  No anesthesia care post op

## 2022-08-04 ENCOUNTER — TELEPHONE (OUTPATIENT)
Dept: VASCULAR SURGERY | Facility: CLINIC | Age: 52
End: 2022-08-04

## 2022-08-05 ENCOUNTER — HOSPITAL ENCOUNTER (OUTPATIENT)
Dept: ULTRASOUND IMAGING | Facility: HOSPITAL | Age: 52
Discharge: HOME | End: 2022-08-05
Admitting: SURGERY

## 2022-08-05 ENCOUNTER — OFFICE VISIT (OUTPATIENT)
Dept: VASCULAR SURGERY | Facility: CLINIC | Age: 52
End: 2022-08-05

## 2022-08-05 VITALS
HEART RATE: 71 BPM | HEIGHT: 69 IN | WEIGHT: 283.4 LBS | DIASTOLIC BLOOD PRESSURE: 86 MMHG | BODY MASS INDEX: 41.98 KG/M2 | OXYGEN SATURATION: 96 % | SYSTOLIC BLOOD PRESSURE: 118 MMHG

## 2022-08-05 DIAGNOSIS — E78.5 HYPERLIPIDEMIA, UNSPECIFIED HYPERLIPIDEMIA TYPE: ICD-10-CM

## 2022-08-05 DIAGNOSIS — I87.323 CHRONIC VENOUS HYPERTENSION WITH INFLAMMATION INVOLVING BOTH SIDES: Primary | ICD-10-CM

## 2022-08-05 DIAGNOSIS — I87.323 CHRONIC VENOUS HYPERTENSION WITH INFLAMMATION INVOLVING BOTH SIDES: ICD-10-CM

## 2022-08-05 DIAGNOSIS — I10 PRIMARY HYPERTENSION: ICD-10-CM

## 2022-08-05 PROCEDURE — 93971 EXTREMITY STUDY: CPT

## 2022-08-05 PROCEDURE — 99213 OFFICE O/P EST LOW 20 MIN: CPT | Performed by: NURSE PRACTITIONER

## 2022-08-05 PROCEDURE — 93971 EXTREMITY STUDY: CPT | Performed by: SURGERY

## 2022-08-05 NOTE — PROGRESS NOTES
"08/05/2022      Lashell Gusman APRN  [address] [facility] [address]        Lizabeth ARAGON Naseem  1970    Chief Complaint   Patient presents with   • Post Op Exam     Pt presents for 1 week post op exam with imaging after having right RFA on 7/29/22. Pt had no c/o pain until this morning after getting out of her car. States it felt like a sting. Pt has never been a smoker.        Dear Lashell Gusman APRN:    HPI     I had the pleasure of seeing you patient in the office today for follow up.  As you recall, the patient is a 52 y.o. female who we are currently following for venous insufficiency.  She had previous vein procedures in [place].  Following back surgery she developed superficial phlebitis to bilateral thighs.  She has chronic swelling to her lower extremities with heaviness, tiredness, and aching.  She wears compression stockings daily without significant relief.  She did undergo radiofrequency ablation of the right lower extremity greater saphenous vein on 7/29/2022.  Overall she has done well and has not had any pain until when she was getting out of her car this morning.  She did have noninvasive testing performed today, which I did review in office.    Review of Systems   Constitutional: Negative.    HENT: Negative.    Eyes: Negative.    Respiratory: Negative.    Cardiovascular: Positive for leg swelling.        Leg cramping   Gastrointestinal: Negative.    Endocrine: Negative.    Genitourinary: Negative.    Musculoskeletal: Negative.    Skin: Negative.    Allergic/Immunologic: Negative.    Neurological: Negative.    Hematological: Negative.    Psychiatric/Behavioral: Negative.        /86   Pulse 71   Ht 174 cm (68.5\")   Wt 129 kg (283 lb 6.4 oz)   LMP  (LMP Unknown) Comment: doesn't have periods while on mirena  SpO2 96%   BMI 42.46 kg/m²   Physical Exam  Vitals and nursing note reviewed.   Constitutional:       General: She is not in acute distress.     Appearance: She is " well-developed. She is not diaphoretic.   HENT:      Head: Normocephalic and atraumatic.   Eyes:      General: No scleral icterus.     Pupils: Pupils are equal, round, and reactive to light.   Neck:      Thyroid: No thyromegaly.      Vascular: No carotid bruit or JVD.   Cardiovascular:      Rate and Rhythm: Normal rate and regular rhythm.      Pulses: Normal pulses.      Heart sounds: Normal heart sounds and S2 normal. No murmur heard.    No friction rub. No gallop.      Comments: Varicose veins to bilateral lower extremities  Pulmonary:      Effort: Pulmonary effort is normal.      Breath sounds: Normal breath sounds.   Abdominal:      General: Bowel sounds are normal.      Palpations: Abdomen is soft.   Musculoskeletal:         General: Swelling present. Normal range of motion.      Cervical back: Normal range of motion and neck supple.   Skin:     General: Skin is warm and dry.   Neurological:      General: No focal deficit present.      Mental Status: She is alert and oriented to person, place, and time.      Cranial Nerves: No cranial nerve deficit.   Psychiatric:         Mood and Affect: Mood normal.         Behavior: Behavior normal.         Thought Content: Thought content normal.         Judgment: Judgment normal.         DIAGNOSTIC DATA:    US Guided Vascular Access    Result Date: 7/29/2022  Narrative: History: Swelling  Comments: Grayscale imaging as well as color flow duplex were used to evaluate the left lower extremity greater saphenous vein during venous closure.  The greater saphenous vein was successfully cannulated just below the knee. The catheter was placed up to the junction and pulled back 3 cm and marked.      Impression: Successful endovenous closure of the left lower extremity greater saphenous vein. This report was finalized on 07/29/2022 11:39 by Dr. Roman Groves MD.    US Venous Doppler Lower Extremity Right (duplex)    Result Date: 8/6/2022  Narrative: History: Swelling       Impression: Impression: 1. There is no evidence of deep venous thrombosis of the right lower extremity. 2. The greater saphenous vein is closed from zones 2 through 7.  Comments: Right lower extremity venous duplex exam was performed using color Doppler flow, Doppler wave form analysis, and grayscale imaging, with and without compression. There is no evidence of deep venous thrombosis of the common femoral, superficial femoral, popliteal, posterior tibial, and peroneal veins. There is no thrombus identified in the saphenofemoral junction.  This report was finalized on 08/06/2022 11:29 by Dr. Roman Groves MD.    US Venous Doppler Lower Extremity Bilateral (duplex)    Result Date: 7/12/2022  Narrative: History: Swelling   Comments: Venous valvular insufficiency testing was performed in the bilateral lower extremities using duplex ultrasound with compression techniques.  The common femoral vein, superficial femoral, popliteal vein, posterior tibial vein, peroneal vein, greater saphenous vein, and lesser saphenous veins were interrogated.  On the right, the greater saphenous vein at the junction measured 5.9mm. In the mid thigh measured 4.7mm. Above the knee measured 5.6mm. In the mid calf measured 2.7mm. At the ankle measured 3.6mm. There is greater than 0.5 seconds of reflux from the junction to the knee. The lesser saphenous vein is within normal limits and no evidence of reflux. There is no evidence of DVT. Anterior accessory branch measures 5.9 mm and has greater than 0.5 seconds of reflux. It also is thrombosed from the proximal to mid thigh. There is a large posterior branch that runs superficial from the mid thigh to the proximal calf that connects back to the greater saphenous vein.  On the left, the greater saphenous vein at the junction measured 6.1mm. From the proximal thigh to the knee the vein was not visualized. In the mid calf measured 2.5mm. At the ankle measured 2.2mm. There is greater than 0.5  seconds of reflux at the junction. The lesser saphenous vein is within normal limits and no evidence of reflux. There is no evidence of DVT. Anterior accessory branch measures 6.5 mm and has greater than 0.5 seconds of reflux. There is a large varicose vein cluster at the saphenofemoral junction extending into the proximal anterior thigh.      Impression: Impression: There is evidence of venous insufficiency in both lower extremity greater saphenous veins right greater than left. There is also evidence of reflux in both anterior accessory saphenous veins. The right anterior accessory vein has thrombus from the proximal to mid thigh. The patient has had a history of RFA's bilaterally as well as stab phlebectomys.  This report was finalized on 07/12/2022 16:38 by Dr. Roman Groves MD.      Patient Active Problem List   Diagnosis   • Lateral epicondylitis of right elbow   • Hyperlipidemia   • Hypertension   • Chronic venous hypertension with inflammation involving both sides   • Preop testing         ICD-10-CM ICD-9-CM   1. Chronic venous hypertension with inflammation involving both sides  I87.323 459.32   2. Hyperlipidemia, unspecified hyperlipidemia type  E78.5 272.4   3. Primary hypertension  I10 401.9         PLAN: After thoroughly evaluating Lizabeth Gusman, I believe the best course of action is to remain conservative from vascular surgery standpoint.  Overall she is doing well status post radiofrequency ablation of the right lower extremity.  She does report her leg feels better overall had some pain getting out of her car this morning but prior to that has been doing well.  I did review her testing which shows a greater saphenous vein closed as expected with no evidence of DVT.  I would like her to continue wearing compression stockings on a daily basis.  At this time she would like to hold on intervention to her left lower extremity.  If she would like to pursue intervention with her left lower extremity  this would require stab phlebectomy.  We did recommend home lymphedema pumps and she is waiting for co-pay to be less.  We will see her back in 6 months for continued surveillance.  I did discuss vascular risk factors as they pertain to the progression of vascular disease including controlling her hypertension and hyperlipidemia.  Her blood pressure stable on her current medications.  She is maintained on fish oil for hyperlipidemia.  Again, Norvasc could be contributing to her leg swelling.  The patient is to continue taking their medications as previously discussed.   This was all discussed in full with complete understanding.  Thank you for allowing me to participate in the care of your patient.  Please do not hesitate to call with any questions or concerns.  We will keep you aware of any further encounters with Lizabeth Gusman.      Sincerely Yours,      ALEKSANDAR Anderson

## 2022-09-26 ENCOUNTER — OFFICE VISIT (OUTPATIENT)
Dept: OBGYN CLINIC | Age: 52
End: 2022-09-26
Payer: COMMERCIAL

## 2022-09-26 VITALS
DIASTOLIC BLOOD PRESSURE: 90 MMHG | BODY MASS INDEX: 41.77 KG/M2 | SYSTOLIC BLOOD PRESSURE: 140 MMHG | HEIGHT: 69 IN | WEIGHT: 282 LBS | HEART RATE: 93 BPM

## 2022-09-26 DIAGNOSIS — N39.46 MIXED INCONTINENCE URGE AND STRESS: ICD-10-CM

## 2022-09-26 DIAGNOSIS — Z12.39 ENCOUNTER FOR SCREENING BREAST EXAMINATION: ICD-10-CM

## 2022-09-26 DIAGNOSIS — Z76.89 ENCOUNTER TO ESTABLISH CARE: Primary | ICD-10-CM

## 2022-09-26 DIAGNOSIS — Z12.11 SCREENING FOR COLON CANCER: ICD-10-CM

## 2022-09-26 DIAGNOSIS — Z12.31 ENCOUNTER FOR SCREENING MAMMOGRAM FOR MALIGNANT NEOPLASM OF BREAST: ICD-10-CM

## 2022-09-26 DIAGNOSIS — Z12.4 SCREENING FOR CERVICAL CANCER: ICD-10-CM

## 2022-09-26 DIAGNOSIS — Z01.419 ENCOUNTER FOR GYNECOLOGICAL EXAMINATION WITHOUT ABNORMAL FINDING: ICD-10-CM

## 2022-09-26 PROCEDURE — 99386 PREV VISIT NEW AGE 40-64: CPT | Performed by: NURSE PRACTITIONER

## 2022-09-26 ASSESSMENT — ENCOUNTER SYMPTOMS
CONSTIPATION: 0
GASTROINTESTINAL NEGATIVE: 1
EYES NEGATIVE: 1
DIARRHEA: 0
RESPIRATORY NEGATIVE: 1
ALLERGIC/IMMUNOLOGIC NEGATIVE: 1

## 2022-09-26 NOTE — PROGRESS NOTES
Jarad Bryan is a 47 y.o. female who presents today for her medical conditions/ complaints as noted below. Jarad Bryan is c/o of Establish Care, Annual Exam, and Procedure (IUD removal )        HPI  New pt presents to establish care. Needs order to screening mammogram for Dannielle Shaffer- has been about 5 years since her last exam and mammogram. Has had the Mirena for 5 years and doing well, no periods. Unsure if she needs removed at this time. Denies any hot flashes or night sweats. Having some bladder leaking with coughing and sneezing and also can't hold it on the way to the bathroom- since back surgery. Taking Vesicare and not having much improvement. Sees PCP. Needs PT since back surgery, but has not scheduled yet. [id number]  Pap [id number]  Contraception:IUD Mirena    P:2  Ab:0  Bone density:around 15 yrs ago   Colonoscopy:NA   No LMP recorded.       Past Medical History:   Diagnosis Date    Allergic rhinitis     CKD (chronic kidney disease), symptom management only, stage 1     Hyperlipidemia     Hypertension     Proteinuria      Past Surgical History:   Procedure Laterality Date    BACK SURGERY      CHOLECYSTECTOMY, LAPAROSCOPIC N/A 2021    LAPAROSCOPIC ASSIST ROBOTIC CHOLECYSTECTOMY WITH ICG performed by Todd Huddleston MD at . [address] Right 2019    VARICOSE VEIN SURGERY Right     due to blood clots    WISDOM TOOTH EXTRACTION       Family History   Problem Relation Age of Onset    Diabetes Mother     Stroke Mother     Coronary Art Dis Father      Social History     Tobacco Use    Smoking status: Never    Smokeless tobacco: Never   Substance Use Topics    Alcohol use: Yes     Comment: RARELY       Current Outpatient Medications   Medication Sig Dispense Refill    losartan (COZAAR) 100 MG tablet Take 100 mg by mouth daily      hydroCHLOROthiazide (HYDRODIURIL) 25 MG tablet Take 25 mg by mouth daily      Cholecalciferol (VITAMIN D3) 50 MCG ( UT) CAPS Take 1 capsule by mouth daily      famotidine (PEPCID) 40 MG tablet Take 40 mg by mouth daily      amLODIPine (NORVASC) 10 MG tablet Take 10 mg by mouth daily      Omega-3 Fatty Acids (FISH OIL PO) Take 1 capsule by mouth daily       solifenacin (VESICARE) 5 MG tablet Take 10 mg by mouth daily      cetirizine (ZYRTEC) 10 MG tablet Take 10 mg by mouth daily      levonorgestrel (MIRENA) IUD 52 mg 1 each by Intrauterine route once      carvedilol (COREG) 3.125 MG tablet Take 3.125 mg by mouth 2 times daily (with meals)       No current facility-administered medications for this visit. Allergies   Allergen Reactions    Sulfa Antibiotics      Vitals:    09/26/22 1019   BP: (!) 170/101   Pulse: 93     Body mass index is 41.64 kg/m². Review of Systems   Constitutional: Negative. HENT: Negative. Eyes: Negative. Respiratory: Negative. Cardiovascular: Negative. Gastrointestinal: Negative. Negative for constipation and diarrhea. Endocrine: Negative. Genitourinary:  Positive for enuresis. Negative for frequency, menstrual problem and urgency. Musculoskeletal: Negative. Skin: Negative. Allergic/Immunologic: Negative. Neurological: Negative. Hematological: Negative. Psychiatric/Behavioral: Negative. All other systems reviewed and are negative. Physical Exam  Vitals and nursing note reviewed. Constitutional:       Appearance: She is well-developed. HENT:      Head: Normocephalic. Neck:      Thyroid: No thyroid mass or thyromegaly. Cardiovascular:      Rate and Rhythm: Normal rate and regular rhythm. Pulmonary:      Effort: Pulmonary effort is normal.      Breath sounds: Normal breath sounds. Chest:   Breasts:     Right: No inverted nipple, mass, nipple discharge or skin change. Left: No inverted nipple, mass, nipple discharge or skin change. Abdominal:      Palpations: Abdomen is soft. There is no mass. Tenderness: There is no abdominal tenderness.    Genitourinary: General: Normal vulva. Vagina: Normal.      Cervix: No cervical motion tenderness. Uterus: Normal. Not enlarged. Adnexa:         Right: No mass or tenderness. Left: No mass or tenderness. Comments: Pap collected  Exam difficult d/t pt body habitus  IUD strings present at external os  Musculoskeletal:         General: Normal range of motion. Cervical back: Normal range of motion and neck supple. Skin:     General: Skin is warm and dry. Neurological:      Mental Status: She is alert and oriented to person, place, and time. Psychiatric:         Attention and Perception: Attention normal.         Mood and Affect: Mood normal.         Speech: Speech normal.         Behavior: Behavior normal.         Thought Content: Thought content normal.         Cognition and Memory: Cognition normal.         Judgment: Judgment normal.        Diagnosis Orders   1. Encounter to establish care        2. Encounter for gynecological examination without abnormal finding        3. Screening for cervical cancer  PAP SMEAR    Human papillomavirus (HPV) DNA probe thin prep high risk      4. Encounter for screening mammogram for malignant neoplasm of breast  FAYE DIGITAL SCREEN W OR WO CAD BILATERAL      5. Encounter for screening breast examination        6. Screening for colon cancer  Cory Houser MD, Gastroenterology, [place]      7. Mixed incontinence urge and stress            MEDICATIONS:  No orders of the defined types were placed in this encounter.       ORDERS:  Orders Placed This Encounter   Procedures    FAYE DIGITAL SCREEN W OR WO CAD BILATERAL    PAP SMEAR    Human papillomavirus (HPV) DNA probe thin prep high risk    [facility]       PLAN:  Pap collected  Ordered screening mammogram- will fax to Yolanda Barrera  Referral for screening colonoscopy  Discussed mixed incontinence and tx options- vagial estrogen and PFPT, will consider  Has to have PT for her back but going somewhere close to home, will check on PFPT there  Hold on removing IUD for now- will be good for pregnancy protection 2 more years    Patient Instructions   Patient Education       Breast Self-Exam: Care Instructions  Your Care Instructions     A breast self-exam is when you check your breasts for lumps or changes. This regular exam helps you learn how your breasts normally look and feel. Most breast problems or changes are not because of cancer. Breast self-exam is not a substitute for a mammogram. Having regular breast exams by your doctor and regular mammograms improve your chances of finding any problems with your breasts. Some women set a time each month to do a step-by-step breast self-exam. Other women like a less formal system. They might look at their breasts as they brush their teeth, or feel their breasts once in a while in the shower. If you notice a change in your breast, tell your doctor. Follow-up care is a key part of your treatment and safety. Be sure to make and go to all appointments, and call your doctor if you are having problems. It's also a good idea to know your test results and keep a list of the medicines you take. How do you do a breast self-exam?  The best time to examine your breasts is usually one week after your menstrual period begins. Your breasts should not be tender then. If you do not have periods, you might do your exam on a day of the month that is easy to remember. To examine your breasts:  Remove all your clothes above the waist and lie down. When you are lying down, your breast tissue spreads evenly over your chest wall, which makes it easier to feel all your breast tissue. Use the pads--not the fingertips--of the 3 middle fingers of your left hand to check your right breast. Move your fingers slowly in small coin-sized circles that overlap. Use three levels of pressure to feel of all your breast tissue.  Use light pressure to feel the tissue close to the skin surface. Use medium pressure to feel a little deeper. Use firm pressure to feel your tissue close to your breastbone and ribs. Use each pressure level to feel your breast tissue before moving on to the next spot. Check your entire breast, moving up and down as if following a strip from the collarbone to the bra line, and from the armpit to the ribs. Repeat until you have covered the entire breast.  Repeat this procedure for your left breast, using the pads of the 3 middle fingers of your right hand. To examine your breasts while in the shower:  Place one arm over your head and lightly soap your breast on that side. Using the pads of your fingers, gently move your hand over your breast (in the strip pattern described above), feeling carefully for any lumps or changes. Repeat for the other breast.  Have your doctor inspect anything you notice to see if you need further testing. Where can you learn more? Go to https://[employer]. org and sign in to your [employer] account. Enter P148 in the [employer] box to learn more about \"Breast Self-Exam: Care Instructions. \"     If you do not have an account, please click on the \"Sign Up Now\" link. Current as of: November 22, 2021               Content Version: 13.4  © 2006-2022 [employer]. Care instructions adapted under license by [facility] ([facility]). If you have questions about a medical condition or this instruction, always ask your healthcare professional. [place] [address] any warranty or liability for your use of this information.

## 2022-09-26 NOTE — PATIENT INSTRUCTIONS
Patient Education        Breast Self-Exam: Care Instructions  Your Care Instructions     A breast self-exam is when you check your breasts for lumps or changes. This regular exam helps you learn how your breasts normally look and feel. Most breast problems or changes are not because of cancer. Breast self-exam is not a substitute for a mammogram. Having regular breast exams by your doctor and regular mammograms improve your chances of finding any problems with your breasts. Some women set a time each month to do a step-by-step breast self-exam. Other women like a less formal system. They might look at their breasts as they brush their teeth, or feel their breasts once in a while in the shower. If you notice a change in your breast, tell your doctor. Follow-up care is a key part of your treatment and safety. Be sure to make and go to all appointments, and call your doctor if you are having problems. It's also a good idea to know your test results and keep a list of the medicines you take. How do you do a breast self-exam?  The best time to examine your breasts is usually one week after your menstrual period begins. Your breasts should not be tender then. If you do not have periods, you might do your exam on a day of the month that is easy to remember. To examine your breasts:  Remove all your clothes above the waist and lie down. When you are lying down, your breast tissue spreads evenly over your chest wall, which makes it easier to feel all your breast tissue. Use the pads--not the fingertips--of the 3 middle fingers of your left hand to check your right breast. Move your fingers slowly in small coin-sized circles that overlap. Use three levels of pressure to feel of all your breast tissue. Use light pressure to feel the tissue close to the skin surface. Use medium pressure to feel a little deeper. Use firm pressure to feel your tissue close to your breastbone and ribs.  Use each pressure level to feel your
44 y/o male with no significant past medical history, presents today for laparoscopic left nephrectomy for living kidney donor.

## 2022-09-26 NOTE — PROGRESS NOTES
Pt presents today to establish care for pap smear and breast exam.  She is wanting her IUD taken out it is time and discuss b/c. She is wanting to discuss weight loss had back and leg surgery thinks has to do with this.      [id number]  Pap [id number]  Contraception:IUD Mirena    P:2  Ab:0  Bone density:around 15 yrs ago   Colonoscopy:NA

## 2022-09-30 LAB
HPV COMMENT: NORMAL
HPV TYPE 16: NOT DETECTED
HPV TYPE 18: NOT DETECTED
HPVOH (OTHER TYPES): NOT DETECTED

## 2023-02-20 ENCOUNTER — TELEPHONE (OUTPATIENT)
Dept: VASCULAR SURGERY | Facility: CLINIC | Age: 53
End: 2023-02-20
Payer: COMMERCIAL

## 2023-02-20 NOTE — TELEPHONE ENCOUNTER
Attempted to reschedule cancelled appt from 02/06/23 with Victoria. No answer, lm for return call.  If pt calls back please reschedule at that time.

## 2024-03-26 ENCOUNTER — TRANSCRIBE ORDERS (OUTPATIENT)
Dept: ADMINISTRATIVE | Facility: HOSPITAL | Age: 54
End: 2024-03-26
Payer: COMMERCIAL

## 2024-03-26 DIAGNOSIS — M54.16 LUMBAR RADICULOPATHY: Primary | ICD-10-CM

## 2024-07-17 ENCOUNTER — HOSPITAL ENCOUNTER (OUTPATIENT)
Dept: MRI IMAGING | Age: 54
Discharge: HOME | End: 2024-07-17
Payer: COMMERCIAL

## 2024-07-17 DIAGNOSIS — M25.511 RIGHT SHOULDER PAIN, UNSPECIFIED CHRONICITY: ICD-10-CM

## 2024-07-17 PROCEDURE — 73221 MRI JOINT UPR EXTREM W/O DYE: CPT

## 2024-08-21 ENCOUNTER — OFFICE VISIT (OUTPATIENT)
Dept: GASTROENTEROLOGY | Facility: CLINIC | Age: 54
End: 2024-08-21
Payer: COMMERCIAL

## 2024-08-21 VITALS
DIASTOLIC BLOOD PRESSURE: 100 MMHG | HEART RATE: 74 BPM | SYSTOLIC BLOOD PRESSURE: 160 MMHG | WEIGHT: 275.6 LBS | BODY MASS INDEX: 40.82 KG/M2 | OXYGEN SATURATION: 100 % | TEMPERATURE: 97.3 F | HEIGHT: 69 IN

## 2024-08-21 DIAGNOSIS — R10.13 DYSPEPSIA: Primary | ICD-10-CM

## 2024-08-21 DIAGNOSIS — Z12.11 ENCOUNTER FOR SCREENING FOR MALIGNANT NEOPLASM OF COLON: ICD-10-CM

## 2024-08-21 DIAGNOSIS — I10 HTN (HYPERTENSION), BENIGN: ICD-10-CM

## 2024-08-21 DIAGNOSIS — Z78.9 NONSMOKER: ICD-10-CM

## 2024-08-21 RX ORDER — PANTOPRAZOLE SODIUM 40 MG/1
40 TABLET, DELAYED RELEASE ORAL DAILY
Qty: 30 TABLET | Refills: 11 | Status: SHIPPED | OUTPATIENT
Start: 2024-08-21

## 2024-08-21 RX ORDER — TIZANIDINE 2 MG/1
TABLET ORAL
COMMUNITY
Start: 2024-07-10

## 2024-08-21 RX ORDER — SODIUM, POTASSIUM,MAG SULFATES 17.5-3.13G
SOLUTION, RECONSTITUTED, ORAL ORAL
Qty: 177 ML | Refills: 0 | Status: SHIPPED | OUTPATIENT
Start: 2024-08-21

## 2024-08-21 RX ORDER — TERBINAFINE HYDROCHLORIDE 250 MG/1
250 TABLET ORAL DAILY
COMMUNITY

## 2024-08-21 RX ORDER — HYDROCODONE BITARTRATE AND ACETAMINOPHEN 10; 325 MG/1; MG/1
TABLET ORAL SEE ADMIN INSTRUCTIONS
COMMUNITY
Start: 2024-08-07

## 2024-08-21 NOTE — PROGRESS NOTES
Lizabeth Gusman  1970 8/21/2024  Chief Complaint   Patient presents with    GI Problem     Acid reflux at night, gas, throat burns     Subjective   HPI  Lizabeth Gusman is a 54 y.o. female who presents with a complaint of acid reflux indigestion awakening her at night started in Feb/March. It is a regurgitation and burning up into her throat. She started taking Famotidine and this did help her with her daily symptoms beginning a few years ago. She does have excess gas and belching. No dysphagia. No loss in appetite. She has never had endoscopy.      She has never had a colonoscopy. No change in bowels. No rectal bleeding. No family hx for colon cancer.     Past Medical History:   Diagnosis Date    Arthritis     DVT (deep venous thrombosis)     Elevated cholesterol     GERD (gastroesophageal reflux disease)     Hyperlipidemia     Hypertension     Stage 1 chronic kidney disease      Past Surgical History:   Procedure Laterality Date    BACK SURGERY  05/17/2022    CHOLECYSTECTOMY      SHOULDER MANIPULATION      VARICOSE VEIN SURGERY Bilateral     VARICOSE VEIN SURGERY N/A 07/29/2022    Procedure: RIGHT SAPHENOUS VEIN RADIO FREQUENCY ABLATION and posterior branch;  Surgeon: Roman Groves DO;  Location: [place] [address];  Service: Vascular;  Laterality: N/A;       Outpatient Medications Marked as Taking for the 8/21/24 encounter (Office Visit) with Elva Maharaj APRN   Medication Sig Dispense Refill    amLODIPine (NORVASC) 10 MG tablet Take 1 tablet by mouth Daily.      aspirin 81 MG EC tablet Take 1 tablet by mouth Daily.      carvedilol (COREG) 3.125 MG tablet 1 tablet 2 (Two) Times a Day.      cetirizine (zyrTEC) 10 MG tablet Take 1 tablet by mouth Daily.      hydroCHLOROthiazide (HYDRODIURIL) 25 MG tablet 1 tablet 2 (Two) Times a Day.      HYDROcodone-acetaminophen (NORCO)  MG per tablet Take  by mouth See Admin Instructions. Take 1 tablet by mouth every 4-6 hours as needed for pain       losartan (COZAAR) 100 MG tablet 1 tablet Daily.      solifenacin (VESICARE) 10 MG tablet 1 tablet 2 (Two) Times a Day As Needed.      terbinafine (lamiSIL) 250 MG tablet Take 1 tablet by mouth Daily.      tiZANidine (ZANAFLEX) 2 MG tablet TAKE 1 TO 2 TABLETS BY MOUTH DAILY AT BEDTIME AS NEEDED      VITAMIN D PO Take 5,000 Units by mouth Daily.      [DISCONTINUED] famotidine (PEPCID) 10 MG tablet 1 tablet Daily.       Allergies   Allergen Reactions    Sulfa Antibiotics Rash     Social History     Socioeconomic History    Marital status:    Tobacco Use    Smoking status: Never    Smokeless tobacco: Never   Vaping Use    Vaping status: Never Used   Substance and Sexual Activity    Alcohol use: Yes     Comment: Rarely    Drug use: Never    Sexual activity: Defer     Family History   Problem Relation Age of Onset    Stroke Mother     Anuerysm Mother     Coronary artery disease Father     Atrial fibrillation Father     Colon cancer Neg Hx      Health Maintenance   Topic Date Due    LIPID PANEL  Never done    BMI FOLLOWUP  Never done    COLORECTAL CANCER SCREENING  Never done    TDAP/TD VACCINES (1 - Tdap) Never done    ZOSTER VACCINE (1 of 2) Never done    MAMMOGRAM  05/23/2019    HEPATITIS C SCREENING  Never done    ANNUAL PHYSICAL  Never done    COVID-19 Vaccine (1 - 2023-24 season) Never done    INFLUENZA VACCINE  08/01/2024    PAP SMEAR  09/26/2025    Pneumococcal Vaccine 0-64  Aged Out     Review of Systems   Constitutional:  Negative for activity change, appetite change, chills, diaphoresis, fatigue, fever and unexpected weight change.   HENT:  Negative for ear pain, hearing loss, mouth sores, sore throat, trouble swallowing and voice change.    Eyes: Negative.    Respiratory:  Negative for cough, choking, shortness of breath and wheezing.    Cardiovascular:  Negative for chest pain and palpitations.   Gastrointestinal:  Negative for abdominal pain, blood in stool, constipation, diarrhea, nausea and vomiting.  "       Reflux indigestion   Endocrine: Negative for cold intolerance and heat intolerance.   Genitourinary:  Negative for decreased urine volume, dysuria, frequency, hematuria and urgency.   Musculoskeletal:  Negative for back pain, gait problem and myalgias.   Skin:  Negative for color change, pallor and rash.   Allergic/Immunologic: Negative for food allergies and immunocompromised state.   Neurological:  Negative for dizziness, tremors, seizures, syncope, weakness, light-headedness, numbness and headaches.   Hematological:  Negative for adenopathy. Does not bruise/bleed easily.   Psychiatric/Behavioral:  Negative for agitation and confusion. The patient is not nervous/anxious.    All other systems reviewed and are negative.    Objective   Vitals:    08/21/24 1347   BP: 160/100   Pulse: 74   Temp: 97.3 °F (36.3 °C)   SpO2: 100%   Weight: 125 kg (275 lb 9.6 oz)   Height: 175.3 cm (69\")     Body mass index is 40.7 kg/m².  Physical Exam  Constitutional:       Appearance: She is well-developed.   HENT:      Head: Normocephalic and atraumatic.   Eyes:      Pupils: Pupils are equal, round, and reactive to light.   Neck:      Trachea: No tracheal deviation.   Cardiovascular:      Rate and Rhythm: Normal rate and regular rhythm.      Heart sounds: Normal heart sounds. No murmur heard.     No friction rub. No gallop.   Pulmonary:      Effort: Pulmonary effort is normal. No respiratory distress.      Breath sounds: Normal breath sounds. No wheezing or rales.   Chest:      Chest wall: No tenderness.   Abdominal:      General: Bowel sounds are normal. There is no distension.      Palpations: Abdomen is soft. Abdomen is not rigid.      Tenderness: There is no abdominal tenderness. There is no guarding or rebound.   Musculoskeletal:         General: No tenderness or deformity. Normal range of motion.      Cervical back: Normal range of motion and neck supple.   Skin:     General: Skin is warm and dry.      Coloration: Skin is " not pale.      Findings: No rash.   Neurological:      Mental Status: She is alert and oriented to person, place, and time.      Deep Tendon Reflexes: Reflexes are normal and symmetric.   Psychiatric:         Behavior: Behavior normal.         Thought Content: Thought content normal.         Judgment: Judgment normal.       Assessment & Plan   Diagnoses and all orders for this visit:    1. Dyspepsia (Primary)  -     Case Request; Standing  -     Case Request    2. Encounter for screening for malignant neoplasm of colon  -     sodium-potassium-magnesium sulfates (Suprep Bowel Prep Kit) 17.5-3.13-1.6 GM/177ML solution oral solution; Take as directed by office instructions provided  Dispense: 177 mL; Refill: 0    3. Nonsmoker    4. HTN (hypertension), benign  Comments:  cont BP medication the day of procedure    Other orders  -     Implement Anesthesia Orders Day of Procedure; Standing  -     Follow Anesthesia Guidelines / Protocol; Future  -     Obtain Informed Consent; Future  -     Verify Bowel Prep Was Successful; Standing  -     Obtain Informed Consent; Standing  -     pantoprazole (PROTONIX) 40 MG EC tablet; Take 1 tablet by mouth Daily.  Dispense: 30 tablet; Refill: 11    I discussed non pharmaceutical treatment of gerd.  This includes gradually losing weight to achieve ideal body wt., elevation of the head of bed by 4-6 inches, nothing to eat or drink 3 hours prior to lying down, avoiding tight clothing, stress reduction, tobacco cessation, reduction of alcohol intake, and dietary restrictions (avoiding caffeine, coffee, fatty foods, mints, chocolate, spicy foods and tomato based sauces as much as possible).        ESOPHAGOGASTRODUODENOSCOPY WITH ANESTHESIA (N/A), COLONOSCOPY WITH ANESTHESIA (N/A)  Part of this note may be an electronic transcription/translation of spoken language to printed text using the Dragon Dictation System.  Body mass index is 40.7 kg/m².  Return if symptoms worsen or fail to  improve.    BMI cannot be calculated due to outdated height or weight values.  Please input a current height/weight in Vitals and re-renter BMIFOLLOWUP in Note to pull in correct documentation based on BMI range.      All risks, benefits, alternatives, and indications of colonoscopy and/or Endoscopy procedure have been discussed with the patient. Risks to include perforation of the colon requiring possible surgery or colostomy, risk of bleeding from biopsies or removal of colon tissue, possibility of missing a colon polyp or cancer, or adverse drug reaction.  Benefits to include the diagnosis and management of disease of the colon and rectum. Alternatives to include barium enema, radiographic evaluation, lab testing or no intervention. Pt verbalizes understanding and agrees.     Elva Maharaj, APRN  8/21/2024  14:37 CDT          If you smoke or use tobacco, 4 minutes reading provided  Steps to Quit Smoking  Smoking tobacco can be harmful to your health and can affect almost every organ in your body. Smoking puts you, and those around you, at risk for developing many serious chronic diseases. Quitting smoking is difficult, but it is one of the best things that you can do for your health. It is never too late to quit.  What are the benefits of quitting smoking?  When you quit smoking, you lower your risk of developing serious diseases and conditions, such as:  Lung cancer or lung disease, such as COPD.  Heart disease.  Stroke.  Heart attack.  Infertility.  Osteoporosis and bone fractures.  Additionally, symptoms such as coughing, wheezing, and shortness of breath may get better when you quit. You may also find that you get sick less often because your body is stronger at fighting off colds and infections. If you are pregnant, quitting smoking can help to reduce your chances of having a baby of low birth weight.  How do I get ready to quit?  When you decide to quit smoking, create a plan to make sure that you are  successful. Before you quit:  Pick a date to quit. Set a date within the next two weeks to give you time to prepare.  Write down the reasons why you are quitting. Keep this list in places where you will see it often, such as on your bathroom mirror or in your car or wallet.  Identify the people, places, things, and activities that make you want to smoke (triggers) and avoid them. Make sure to take these actions:  Throw away all cigarettes at home, at work, and in your car.  Throw away smoking accessories, such as ashtrays and lighters.  Clean your car and make sure to empty the ashtray.  Clean your home, including curtains and carpets.  Tell your family, friends, and coworkers that you are quitting. Support from your loved ones can make quitting easier.  Talk with your health care provider about your options for quitting smoking.  Find out what treatment options are covered by your health insurance.  What strategies can I use to quit smoking?  Talk with your healthcare provider about different strategies to quit smoking. Some strategies include:  Quitting smoking altogether instead of gradually lessening how much you smoke over a period of time. Research shows that quitting “cold turkey” is more successful than gradually quitting.  Attending in-person counseling to help you build problem-solving skills. You are more likely to have success in quitting if you attend several counseling sessions. Even short sessions of 10 minutes can be effective.  Finding resources and support systems that can help you to quit smoking and remain smoke-free after you quit. These resources are most helpful when you use them often. They can include:  Online chats with a counselor.  Telephone quitlines.  Printed self-help materials.  Support groups or group counseling.  Text messaging programs.  Mobile phone applications.  Taking medicines to help you quit smoking. (If you are pregnant or breastfeeding, talk with your health care provider  first.) Some medicines contain nicotine and some do not. Both types of medicines help with cravings, but the medicines that include nicotine help to relieve withdrawal symptoms. Your health care provider may recommend:  Nicotine patches, gum, or lozenges.  Nicotine inhalers or sprays.  Non-nicotine medicine that is taken by mouth.  Talk with your health care provider about combining strategies, such as taking medicines while you are also receiving in-person counseling. Using these two strategies together makes you more likely to succeed in quitting than if you used either strategy on its own.  If you are pregnant or breastfeeding, talk with your health care provider about finding counseling or other support strategies to quit smoking. Do not take medicine to help you quit smoking unless told to do so by your health care provider.  What things can I do to make it easier to quit?  Quitting smoking might feel overwhelming at first, but there is a lot that you can do to make it easier. Take these important actions:  Reach out to your family and friends and ask that they support and encourage you during this time. Call telephone quitlines, reach out to support groups, or work with a counselor for support.  Ask people who smoke to avoid smoking around you.  Avoid places that trigger you to smoke, such as bars, parties, or smoke-break areas at work.  Spend time around people who do not smoke.  Lessen stress in your life, because stress can be a smoking trigger for some people. To lessen stress, try:  Exercising regularly.  Deep-breathing exercises.  Yoga.  Meditating.  Performing a body scan. This involves closing your eyes, scanning your body from head to toe, and noticing which parts of your body are particularly tense. Purposefully relax the muscles in those areas.  Download or purchase mobile phone or tablet apps (applications) that can help you stick to your quit plan by providing reminders, tips, and encouragement.  There are many free apps, such as QuitGuide from the CDC (Centers for Disease Control and Prevention). You can find other support for quitting smoking (smoking cessation) through smokefree.gov and other websites.  How will I feel when I quit smoking?  Within the first 24 hours of quitting smoking, you may start to feel some withdrawal symptoms. These symptoms are usually most noticeable 2-3 days after quitting, but they usually do not last beyond 2-3 weeks. Changes or symptoms that you might experience include:  Mood swings.  Restlessness, anxiety, or irritation.  Difficulty concentrating.  Dizziness.  Strong cravings for sugary foods in addition to nicotine.  Mild weight gain.  Constipation.  Nausea.  Coughing or a sore throat.  Changes in how your medicines work in your body.  A depressed mood.  Difficulty sleeping (insomnia).  After the first 2-3 weeks of quitting, you may start to notice more positive results, such as:  Improved sense of smell and taste.  Decreased coughing and sore throat.  Slower heart rate.  Lower blood pressure.  Clearer skin.  The ability to breathe more easily.  Fewer sick days.  Quitting smoking is very challenging for most people. Do not get discouraged if you are not successful the first time. Some people need to make many attempts to quit before they achieve long-term success. Do your best to stick to your quit plan, and talk with your health care provider if you have any questions or concerns.  This information is not intended to replace advice given to you by your health care provider. Make sure you discuss any questions you have with your health care provider.  Document Released: 12/12/2002 Document Revised: 08/15/2017 Document Reviewed: 05/03/2016  Elsevier Interactive Patient Education © 2017 [employer]

## 2024-08-22 PROBLEM — R10.13 DYSPEPSIA: Status: ACTIVE | Noted: 2024-08-21

## 2024-10-14 ENCOUNTER — ANESTHESIA EVENT (OUTPATIENT)
Dept: GASTROENTEROLOGY | Facility: HOSPITAL | Age: 54
End: 2024-10-14
Payer: COMMERCIAL

## 2024-10-14 ENCOUNTER — ANESTHESIA (OUTPATIENT)
Dept: GASTROENTEROLOGY | Facility: HOSPITAL | Age: 54
End: 2024-10-14
Payer: COMMERCIAL

## 2024-10-14 ENCOUNTER — HOSPITAL ENCOUNTER (OUTPATIENT)
Facility: HOSPITAL | Age: 54
Setting detail: HOSPITAL OUTPATIENT SURGERY
Discharge: HOME | End: 2024-10-14
Attending: INTERNAL MEDICINE | Admitting: INTERNAL MEDICINE
Payer: COMMERCIAL

## 2024-10-14 VITALS
HEART RATE: 71 BPM | HEIGHT: 69 IN | SYSTOLIC BLOOD PRESSURE: 111 MMHG | BODY MASS INDEX: 40.88 KG/M2 | TEMPERATURE: 96.5 F | RESPIRATION RATE: 21 BRPM | WEIGHT: 276 LBS | DIASTOLIC BLOOD PRESSURE: 56 MMHG | OXYGEN SATURATION: 97 %

## 2024-10-14 DIAGNOSIS — R10.13 DYSPEPSIA: ICD-10-CM

## 2024-10-14 LAB — B-HCG UR QL: NEGATIVE

## 2024-10-14 PROCEDURE — 81025 URINE PREGNANCY TEST: CPT | Performed by: ANESTHESIOLOGY

## 2024-10-14 PROCEDURE — 88305 TISSUE EXAM BY PATHOLOGIST: CPT | Performed by: INTERNAL MEDICINE

## 2024-10-14 PROCEDURE — 25010000002 PROPOFOL 10 MG/ML EMULSION: Performed by: NURSE ANESTHETIST, CERTIFIED REGISTERED

## 2024-10-14 PROCEDURE — 87081 CULTURE SCREEN ONLY: CPT | Performed by: INTERNAL MEDICINE

## 2024-10-14 PROCEDURE — 25010000002 LIDOCAINE PF 2% 2 % SOLUTION: Performed by: NURSE ANESTHETIST, CERTIFIED REGISTERED

## 2024-10-14 PROCEDURE — 45385 COLONOSCOPY W/LESION REMOVAL: CPT | Performed by: INTERNAL MEDICINE

## 2024-10-14 PROCEDURE — 43239 EGD BIOPSY SINGLE/MULTIPLE: CPT | Performed by: INTERNAL MEDICINE

## 2024-10-14 DEVICE — DEV CLIP ENDO RESOLUTION360 CONTRL ROT 235CM: Type: IMPLANTABLE DEVICE | Site: DESCENDING COLON | Status: FUNCTIONAL

## 2024-10-14 RX ORDER — LIDOCAINE HYDROCHLORIDE 10 MG/ML
0.5 INJECTION, SOLUTION EPIDURAL; INFILTRATION; INTRACAUDAL; PERINEURAL ONCE AS NEEDED
Status: DISCONTINUED | OUTPATIENT
Start: 2024-10-14 | End: 2024-10-14 | Stop reason: HOSPADM

## 2024-10-14 RX ORDER — FAMOTIDINE 20 MG/1
20 TABLET, FILM COATED ORAL DAILY
Status: ON HOLD | COMMUNITY
End: 2024-10-14

## 2024-10-14 RX ORDER — LIDOCAINE HYDROCHLORIDE 20 MG/ML
INJECTION, SOLUTION EPIDURAL; INFILTRATION; INTRACAUDAL; PERINEURAL AS NEEDED
Status: DISCONTINUED | OUTPATIENT
Start: 2024-10-14 | End: 2024-10-14 | Stop reason: SURG

## 2024-10-14 RX ORDER — PROPOFOL 10 MG/ML
VIAL (ML) INTRAVENOUS AS NEEDED
Status: DISCONTINUED | OUTPATIENT
Start: 2024-10-14 | End: 2024-10-14 | Stop reason: SURG

## 2024-10-14 RX ORDER — FAMOTIDINE 20 MG/1
20 TABLET, FILM COATED ORAL 2 TIMES DAILY
Qty: 60 TABLET | Refills: 11 | Status: SHIPPED | OUTPATIENT
Start: 2024-10-14

## 2024-10-14 RX ORDER — SODIUM CHLORIDE 0.9 % (FLUSH) 0.9 %
10 SYRINGE (ML) INJECTION AS NEEDED
Status: DISCONTINUED | OUTPATIENT
Start: 2024-10-14 | End: 2024-10-14 | Stop reason: HOSPADM

## 2024-10-14 RX ADMIN — GLYCOPYRROLATE 0.1 MG: 0.2 INJECTION INTRAMUSCULAR; INTRAVENOUS at 10:46

## 2024-10-14 RX ADMIN — LIDOCAINE HYDROCHLORIDE 200 MG: 20 INJECTION, SOLUTION EPIDURAL; INFILTRATION; INTRACAUDAL; PERINEURAL at 10:47

## 2024-10-14 RX ADMIN — PROPOFOL 800 MG: 10 INJECTION, EMULSION INTRAVENOUS at 10:47

## 2024-10-14 RX ADMIN — Medication 10 ML: at 10:32

## 2024-10-14 NOTE — H&P
Chief Complaint:   Absent colon cancer screening    Subjective     HPI:   Patient has reflux symptoms with nocturnal awakening.  No dysphagia.  No prior endoscopy.    Also here for routine colon cancer screening.  No prior colonoscopy.    Past Medical History:   Past Medical History:   Diagnosis Date    Arthritis     DVT (deep venous thrombosis)     Elevated cholesterol     GERD (gastroesophageal reflux disease)     Hyperlipidemia     Hypertension     Stage 1 chronic kidney disease        Past Surgical History:  Past Surgical History:   Procedure Laterality Date    BACK SURGERY  05/17/2022    CHOLECYSTECTOMY      SHOULDER MANIPULATION      VARICOSE VEIN SURGERY Bilateral     VARICOSE VEIN SURGERY N/A 07/29/2022    Procedure: RIGHT SAPHENOUS VEIN RADIO FREQUENCY ABLATION and posterior branch;  Surgeon: Roman Groves DO;  Location: [facility] OR ;  Service: Vascular;  Laterality: N/A;       Family History:  Family History   Problem Relation Age of Onset    Stroke Mother     Anuerysm Mother     Coronary artery disease Father     Atrial fibrillation Father     Colon cancer Neg Hx        Social History:   reports that she has never smoked. She has never used smokeless tobacco. She reports current alcohol use. She reports that she does not use drugs.    Medications:   Medications Prior to Admission   Medication Sig Dispense Refill Last Dose/Taking    amLODIPine (NORVASC) 10 MG tablet Take 1 tablet by mouth Daily.   10/14/2024 at  6:00 AM    carvedilol (COREG) 3.125 MG tablet 1 tablet 2 (Two) Times a Day.   10/14/2024 at  6:00 AM    famotidine (PEPCID) 20 MG tablet Take 1 tablet by mouth Daily.   10/14/2024 at  6:00 AM    hydroCHLOROthiazide (HYDRODIURIL) 25 MG tablet 1 tablet 2 (Two) Times a Day.   10/14/2024 at  6:00 AM    losartan (COZAAR) 100 MG tablet 1 tablet Daily.   10/14/2024 at  6:00 AM    pantoprazole (PROTONIX) 40 MG EC tablet Take 1 tablet by mouth Daily. 30 tablet 11 Past Month    solifenacin  "(VESICARE) 10 MG tablet 1 tablet 2 (Two) Times a Day As Needed.   10/14/2024 at  6:00 AM    terbinafine (lamiSIL) 250 MG tablet Take 1 tablet by mouth Daily.   Past Month    aspirin 81 MG EC tablet Take 1 tablet by mouth Daily.   10/12/2024 Morning    cetirizine (zyrTEC) 10 MG tablet Take 1 tablet by mouth Daily.   10/12/2024 Morning    HYDROcodone-acetaminophen (NORCO)  MG per tablet Take  by mouth See Admin Instructions. Take 1 tablet by mouth every 4-6 hours as needed for pain   More than a month    Omega-3 Fatty Acids (fish oil) 1000 MG capsule capsule 1 capsule Daily. (Patient not taking: Reported on 8/21/2024)   10/12/2024 Morning    tiZANidine (ZANAFLEX) 2 MG tablet TAKE 1 TO 2 TABLETS BY MOUTH DAILY AT BEDTIME AS NEEDED   10/12/2024    VITAMIN D PO Take 5,000 Units by mouth Daily.   10/12/2024       Allergies:  Sulfa antibiotics    ROS:    Resp: No SOA  Cardiovascular: No CP      Objective     /96 (Patient Position: Sitting)   Pulse 60   Temp 96.5 °F (35.8 °C) (Temporal)   Resp 18   Ht 175.3 cm (69\")   Wt 125 kg (276 lb)   SpO2 95%   BMI 40.76 kg/m²     Physical Exam   Constitutional: Pt is oriented to person, place, and in no distress.  Pulmonary/Chest: No distress.  No audible wheezes   Psychiatric: Mood, memory, affect and judgment appear normal.     Assessment & Plan     Diagnosis:  GERD  Colon cancer screening    Anticipated Surgical Procedure:  Endoscopy and colonoscopy    The risks, benefits, and alternatives of endoscopy were reviewed with the patient today.  Risks including perforation, with or without dilation, possibly requiring surgery.  Additional risks include risk of bleeding.  There is also the risk of a drug reaction or problems with anesthesia.  This will be discussed with the further by the anesthesia team on the day of the procedure. The benefits include the diagnosis and management of disease of the upper digestive tract.  Alternatives to endoscopy include upper GI " series, laboratory testing, radiographic evaluation, or no intervention.  The patient verbalizes understanding and agrees.    The risks, benefits, and alternatives of colonoscopy were reviewed with the patient today.  Risks including perforation of the colon possibly requiring surgery or colostomy.  Additional risks include risk of bleeding from biopsies or removal of colon tissue.  There is also the risk of a drug reaction or problems with anesthesia.  This will be discussed with the further by the anesthesia team on the day of the procedure.  Lastly there is a possibility of missing a colon polyp or cancer.  The benefits include the diagnosis and management of disease of the colon and rectum.  Alternatives to colonoscopy include barium enema, laboratory testing, radiographic evaluation, or no intervention.  The patient verbalizes understanding and agrees.    Please note that portions of this note were completed with a voice recognition program.

## 2024-10-14 NOTE — ANESTHESIA POSTPROCEDURE EVALUATION
"Patient: Lizabeth Gusman    Procedure Summary       Date: 10/14/24 Room / Location: [facility] ENDOSCOPY 6 /  [facility]    Anesthesia Start: 1043 Anesthesia Stop: 1128    Procedures:       ESOPHAGOGASTRODUODENOSCOPY WITH ANESTHESIA      COLONOSCOPY WITH ANESTHESIA Diagnosis:       Dyspepsia      (Dyspepsia [R10.13])    Surgeons: Lizabeth Vazquez MD Provider: Elizabeth Zhu CRNA    Anesthesia Type: MAC ASA Status: 3            Anesthesia Type: MAC    Vitals  Vitals Value Taken Time   /56 10/14/24 1147   Temp     Pulse 66 10/14/24 1150   Resp 21 10/14/24 1145   SpO2 99 % 10/14/24 1150   Vitals shown include unfiled device data.        Post Anesthesia Care and Evaluation    Patient location during evaluation: PACU  Patient participation: complete - patient participated  Level of consciousness: awake and alert  Pain management: adequate    Airway patency: patent  Anesthetic complications: No anesthetic complications    Cardiovascular status: acceptable  Respiratory status: acceptable  Hydration status: acceptable    Comments: Blood pressure 111/56, pulse 71, temperature 96.5 °F (35.8 °C), temperature source Temporal, resp. rate 21, height 175.3 cm (69\"), weight 125 kg (276 lb), SpO2 97%, not currently breastfeeding.    Pt discharged from PACU based on ingrid score >8    "

## 2024-10-15 LAB
CYTO UR: NORMAL
LAB AP CASE REPORT: NORMAL
Lab: NORMAL
PATH REPORT.FINAL DX SPEC: NORMAL
PATH REPORT.GROSS SPEC: NORMAL
UREASE TISS QL: NEGATIVE

## 2024-10-16 PROBLEM — Z86.0101 PERSONAL HISTORY OF ADENOMATOUS AND SERRATED COLON POLYPS: Status: ACTIVE | Noted: 2024-10-16

## 2024-11-19 ENCOUNTER — TELEPHONE (OUTPATIENT)
Dept: GASTROENTEROLOGY | Facility: CLINIC | Age: 54
End: 2024-11-19
Payer: COMMERCIAL

## 2024-11-19 RX ORDER — SUCRALFATE 1 G/1
1 TABLET ORAL 4 TIMES DAILY
Qty: 120 TABLET | Refills: 3 | Status: SHIPPED | OUTPATIENT
Start: 2024-11-19

## 2024-11-19 NOTE — TELEPHONE ENCOUNTER
I’m still having the same issues. The medication isn’t helping. It seems that it doesn’t matter what I eat. I’ve been staying away from spicy and fried foods and chocolate. The acid reflux and burning in the stomach is almost a daily issue. Is there a different medication we can try? A coworker told me to have my heart checked because these are the symptoms she was having prior to her heart attack. Is this something I should be looking into?

## 2024-11-19 NOTE — TELEPHONE ENCOUNTER
EGD on 10/14 showed , normal esophagus., no endoscopic evidence of Teresa's esophagus, Normal stomach, Duodenal erosions without bleeding. Dr. Vazquez started her on pepcid 20 BID patient was unable to tolerate pantoprazole due to diarrhea.

## (undated) DEVICE — THE CHANNEL CLEANING BRUSH IS A NYLON FLEXI BRUSH ATTACHED TO A FLEXIBLE PLASTIC SHEATH DESIGNED TO SAFELY REMOVE DEBRIS FROM FLEXIBLE ENDOSCOPES.

## (undated) DEVICE — BNDG ELAS ECON W/CLIP 4IN 5YD LF STRL

## (undated) DEVICE — PAD MINOR UNIVERSAL: Brand: MEDLINE INDUSTRIES, INC.

## (undated) DEVICE — ST TB EXT STANDARDBORE 30IN

## (undated) DEVICE — Z DISCONTINUED NO SUB IDED PLATE DISP SPL TYP ERBE NESSY P FOR PSD-60

## (undated) DEVICE — SHEATH INTRO MICRO 7F 11CM

## (undated) DEVICE — BAPTIST TURNOVER KIT: Brand: MEDLINE INDUSTRIES, INC.

## (undated) DEVICE — SUTURE VCRL SZ 0 L27IN ABSRB VLT L36MM UR-5 5/8 CIR J376H

## (undated) DEVICE — CANNULA SEAL

## (undated) DEVICE — FRCP BX RADJAW4 NDL 2.8 240 STD OG

## (undated) DEVICE — Device: Brand: DEFENDO AIR/WATER/SUCTION AND BIOPSY VALVE

## (undated) DEVICE — SPNG GZ STRL 2S 4X4 12PLY

## (undated) DEVICE — 3M™ STERI-STRIP™ REINFORCED ADHESIVE SKIN CLOSURES, R1547, 1/2 IN X 4 IN (12 MM X 100 MM), 6 STRIPS/ENVELOPE: Brand: 3M™ STERI-STRIP™

## (undated) DEVICE — SYR LL TP 10ML STRL

## (undated) DEVICE — BNDG ELAS W/CLIP 6IN 10YD LF STRL

## (undated) DEVICE — NEEDLE INSUF L120MM ULT VERES ENDOPATH

## (undated) DEVICE — COVER,MAYO STAND,STERILE: Brand: MEDLINE

## (undated) DEVICE — TUBE ET 7MM NSL ORAL BASIC CUF INTMED MURPHY EYE RADPQ MRK

## (undated) DEVICE — ARM DRAPE

## (undated) DEVICE — CVR PROB GEN PURP W ISOSILK 6X48

## (undated) DEVICE — BANDAGE,GAUZE,BULKEE II,4.5"X4.1YD,STRL: Brand: MEDLINE

## (undated) DEVICE — SUTURE MCRYL SZ 4-0 L18IN ABSRB UD L19MM PS-2 3/8 CIR PRIM Y496G

## (undated) DEVICE — GLV SURG SENSICARE W/ALOE PF LF 7.5 STRL

## (undated) DEVICE — MASK,OXYGEN,MED CONC,ADLT,7' TUB, UC: Brand: PENDING

## (undated) DEVICE — BNDG ADHS CURAD FLX/FABRC 1X3IN STRL LF

## (undated) DEVICE — GLOVE SURG SZ 7 CRM LTX FREE POLYISOPRENE POLYMER BEAD ANTI

## (undated) DEVICE — STPCK 4WY ON/OFF VLV M/COLAR FIT 45PSI STRL

## (undated) DEVICE — GENERAL LAP CDS

## (undated) DEVICE — THE SINGLE USE ETRAP – POLYP TRAP IS USED FOR SUCTION RETRIEVAL OF ENDOSCOPICALLY REMOVED POLYPS.: Brand: ETRAP

## (undated) DEVICE — CLIP INT L POLYMER LOK LIG HEM O LOK

## (undated) DEVICE — LARYNGOSCOPE VID MILLER 2 MTL BLADE M HNDL CURAPLEX

## (undated) DEVICE — CONMED SCOPE SAVER BITE BLOCK, 20X27 MM: Brand: SCOPE SAVER

## (undated) DEVICE — TISSUE RETRIEVAL SYSTEM: Brand: INZII RETRIEVAL SYSTEM

## (undated) DEVICE — INTENDED FOR TISSUE SEPARATION, AND OTHER PROCEDURES THAT REQUIRE A SHARP SURGICAL BLADE TO PUNCTURE OR CUT.: Brand: BARD-PARKER ® STAINLESS STEEL BLADES

## (undated) DEVICE — BLADELESS OBTURATOR: Brand: WECK VISTA

## (undated) DEVICE — DRAPE,UTILITY,XL,4/PK,STERILE: Brand: MEDLINE

## (undated) DEVICE — CATH CLS/FST ENDOVENOUS REMFG 7X100CM

## (undated) DEVICE — APPL CHLORAPREP HI/LITE 26ML ORNG

## (undated) DEVICE — ST INF 2NDARY 20DRP VNT/NOVNT 30IN

## (undated) DEVICE — ADHESIVE SKIN CLSR 0.7ML TOP DERMBND ADV

## (undated) DEVICE — ROYAL SILK SURGICAL GOWN, XXL: Brand: CONVERTORS

## (undated) DEVICE — SUCTION IRRIGATOR: Brand: ENDOWRIST

## (undated) DEVICE — COVER LT HNDL BLU PLAS

## (undated) DEVICE — NEEDLE, QUINCKE, 20GX3.5": Brand: MEDLINE

## (undated) DEVICE — SENSR O2 OXIMAX FNGR A/ 18IN NONSTR

## (undated) DEVICE — SINGLE-USE POLYPECTOMY SNARE: Brand: CAPTIVATOR II

## (undated) DEVICE — CUFF,BP,DISP,1 TUBE,ADULT,HP: Brand: MEDLINE

## (undated) DEVICE — CUFF BLD PRESSURE 1 TUBE AD 25-34 CM ARM VLY FLEXIPORT DISP

## (undated) DEVICE — YANKAUER,BULB TIP WITH VENT: Brand: ARGYLE

## (undated) DEVICE — STERILE ULTRASOUND GEL, SAFEWRAP: Brand: ECOVUE